# Patient Record
Sex: FEMALE | Race: WHITE | NOT HISPANIC OR LATINO | Employment: UNEMPLOYED | URBAN - METROPOLITAN AREA
[De-identification: names, ages, dates, MRNs, and addresses within clinical notes are randomized per-mention and may not be internally consistent; named-entity substitution may affect disease eponyms.]

---

## 2019-05-15 LAB
ABO + RH BLD: NORMAL
C TRACH RRNA SPEC QL PROBE: NEGATIVE
EXT MATERNIT21: NORMAL
HBV SURFACE AG SERPL QL IA: NEGATIVE
HCT VFR BLD AUTO: 41 % (ref 36–46)
HGB BLD-MCNC: 13.8 G/DL (ref 12–16)
HIV 1+2 AB+HIV1 P24 AG SERPL QL IA: NEGATIVE
INDIRECT COOMBS: NEGATIVE
MCV RBC AUTO: 91.6 FL (ref 82–108)
N GONORRHOEAE, AMPLIFIED DNA: NEGATIVE
PLATELET # BLD AUTO: 247 K/?L (ref 150–399)
RPR: NEGATIVE
RUBELLA IMMUNE STATUS: NORMAL
TSH SERPL DL<=0.005 MIU/L-ACNC: 1.49 UIU/ML (ref 0.41–5.9)
URINE CULTURE, ROUTINE: NORMAL

## 2019-06-14 ENCOUNTER — OFFICE VISIT (OUTPATIENT)
Dept: OBSTETRICS AND GYNECOLOGY | Facility: CLINIC | Age: 39
End: 2019-06-14
Payer: MEDICAID

## 2019-06-14 VITALS — RESPIRATION RATE: 18 BRPM | WEIGHT: 148.13 LBS | SYSTOLIC BLOOD PRESSURE: 110 MMHG | DIASTOLIC BLOOD PRESSURE: 72 MMHG

## 2019-06-14 DIAGNOSIS — Z98.891 H/O: C-SECTION: ICD-10-CM

## 2019-06-14 DIAGNOSIS — Z3A.18 18 WEEKS GESTATION OF PREGNANCY: Primary | ICD-10-CM

## 2019-06-14 DIAGNOSIS — Z32.00 POSSIBLE PREGNANCY, NOT YET CONFIRMED: ICD-10-CM

## 2019-06-14 DIAGNOSIS — O09.522 ELDERLY MULTIGRAVIDA IN SECOND TRIMESTER: ICD-10-CM

## 2019-06-14 PROCEDURE — 76815 OB US LIMITED FETUS(S): CPT | Mod: 26,S$PBB,, | Performed by: SPECIALIST

## 2019-06-14 PROCEDURE — 99999 PR PBB SHADOW E&M-NEW PATIENT-LVL III: CPT | Mod: PBBFAC,,, | Performed by: SPECIALIST

## 2019-06-14 PROCEDURE — 76815 OB US LIMITED FETUS(S): CPT | Mod: PBBFAC,PN | Performed by: SPECIALIST

## 2019-06-14 PROCEDURE — 99205 PR OFFICE/OUTPT VISIT, NEW, LEVL V, 60-74 MIN: ICD-10-PCS | Mod: TH,25,S$PBB, | Performed by: SPECIALIST

## 2019-06-14 PROCEDURE — 99205 OFFICE O/P NEW HI 60 MIN: CPT | Mod: TH,25,S$PBB, | Performed by: SPECIALIST

## 2019-06-14 PROCEDURE — 99203 OFFICE O/P NEW LOW 30 MIN: CPT | Mod: PBBFAC,TH,PN | Performed by: SPECIALIST

## 2019-06-14 PROCEDURE — 76815 PR  US,PREGNANT UTERUS,LIMITED, 1/> FETUSES: ICD-10-PCS | Mod: 26,S$PBB,, | Performed by: SPECIALIST

## 2019-06-14 PROCEDURE — 99999 PR PBB SHADOW E&M-NEW PATIENT-LVL III: ICD-10-PCS | Mod: PBBFAC,,, | Performed by: SPECIALIST

## 2019-06-14 NOTE — PROGRESS NOTES
39 yo WF , previous  and positive successful  presents for transfer of PNC from [place]. Pt states has had PNP and anatomy u/s as well as QUAD screen which pt states was WNL, No records. Positive quickening. EGA 18w2d per pt.  Pt desires .  I had a 20 min discussion regardingthe risks and benefits of . I specifically explained that  requires in house management and the fact that on call MD may not agree. Pt voices understanding of the above.  No past medical history on file.    No past surgical history on file.    No family history on file.    Social History     Socioeconomic History    Marital status:      Spouse name: Not on file    Number of children: Not on file    Years of education: Not on file    Highest education level: Not on file   Occupational History    Not on file   Social Needs    Financial resource strain: Not on file    Food insecurity:     Worry: Not on file     Inability: Not on file    Transportation needs:     Medical: Not on file     Non-medical: Not on file   Tobacco Use    Smoking status: Never Smoker    Smokeless tobacco: Never Used   Substance and Sexual Activity    Alcohol use: Not Currently    Drug use: Never    Sexual activity: Yes     Partners: Male   Lifestyle    Physical activity:     Days per week: Not on file     Minutes per session: Not on file    Stress: Not on file   Relationships    Social connections:     Talks on phone: Not on file     Gets together: Not on file     Attends [employer] service: Not on file     Active member of club or organization: Not on file     Attends meetings of clubs or organizations: Not on file     Relationship status: Not on file   Other Topics Concern    Not on file   Social History Narrative    Not on file       Current Outpatient Medications   Medication Sig Dispense Refill    PNV,calcium 72-iron-folic acid (PRENATAL VITAMIN PLUS LOW IRON) 27 mg iron- 1 mg Tab        No current facility-administered  medications for this visit.        Review of patient's allergies indicates:   Allergen Reactions    Allergenic extracts        Review of System:   General: no chills, fever, night sweats, weight gain or weight loss  Psychological: no depression or suicidal ideation  Breasts: no new or changing breast lumps, nipple discharge or masses.  Respiratory: no cough, shortness of breath, or wheezing  Cardiovascular: no chest pain or dyspnea on exertion  Gastrointestinal: no abdominal pain, change in bowel habits, or black or bloody stools  Genito-Urinary: no incontinence, urinary frequency/urgency or vulvar/vaginal symptoms, pelvic pain   Musculoskeletal: no gait disturbance or muscular weakness                                              General Appearance    A and O x 4, Cooperative, no distress   Breasts    Abdomen   Symmetrical, no masses, no discharge, skin changes , erythema or retraction. No adenopathy  Soft, gravid, FH 18cm     Genitourinary:   External rectal exam shows no thrombosed external hemorrhoids.   Pelvic exam was performed with patient supine.  No labial fusion.  There is no rash, lesion or injury on the right labia.  There is no rash, lesion or injury on the left labia.  No bleeding and no signs of injury around the vaginal introitus, urethra is without lesions and well supported. The cervix is visualized with no discharge, lesions or friability.Cervix closed and thick.  No vaginal discharge found.  No significant Cystocele, Enterocele or rectocele, and uterus well supported.     Extremities: Extremities normal, atraumatic, no cyanosis or edema                     Abdominal RTS  Viable male vertex EGA 18w3d  ABI 15.6 Anterior placenta Grade 1    Discussed AMA status and will await prenatal records   Will have pt RTO 4 weeks  At the end of patient visit, nurse and MD both asked pt if any improvements needed in visit experience and asked whether any further pt questions or concerns.

## 2019-07-05 ENCOUNTER — TELEPHONE (OUTPATIENT)
Dept: OBSTETRICS AND GYNECOLOGY | Facility: CLINIC | Age: 39
End: 2019-07-05

## 2019-07-05 NOTE — TELEPHONE ENCOUNTER
----- Message from Mary Santos sent at 7/5/2019  1:26 PM CDT -----  Contact: Pt  Pt is calling in regards to scheduling appt. She is currently 6 months pregnant.      Pls call pt back at .[phone]

## 2019-07-08 ENCOUNTER — TELEPHONE (OUTPATIENT)
Dept: OBSTETRICS AND GYNECOLOGY | Facility: CLINIC | Age: 39
End: 2019-07-08

## 2019-07-08 NOTE — TELEPHONE ENCOUNTER
----- Message from Maria C Tapia sent at 7/8/2019 12:07 PM CDT -----  Contact: pt   Stated she has question about the birth center her at [facility], she can be reached at [phone] Thanks

## 2019-07-11 ENCOUNTER — ROUTINE PRENATAL (OUTPATIENT)
Dept: OBSTETRICS AND GYNECOLOGY | Facility: CLINIC | Age: 39
End: 2019-07-11
Payer: MEDICAID

## 2019-07-11 VITALS — DIASTOLIC BLOOD PRESSURE: 62 MMHG | WEIGHT: 152.31 LBS | SYSTOLIC BLOOD PRESSURE: 110 MMHG

## 2019-07-11 DIAGNOSIS — O09.522 ELDERLY MULTIGRAVIDA IN SECOND TRIMESTER: ICD-10-CM

## 2019-07-11 DIAGNOSIS — Z3A.22 22 WEEKS GESTATION OF PREGNANCY: Primary | ICD-10-CM

## 2019-07-11 DIAGNOSIS — Z98.891 H/O: C-SECTION: ICD-10-CM

## 2019-07-11 PROCEDURE — 99999 PR PBB SHADOW E&M-EST. PATIENT-LVL II: ICD-10-PCS | Mod: PBBFAC,,, | Performed by: SPECIALIST

## 2019-07-11 PROCEDURE — 99214 PR OFFICE/OUTPT VISIT, EST, LEVL IV, 30-39 MIN: ICD-10-PCS | Mod: TH,S$PBB,, | Performed by: SPECIALIST

## 2019-07-11 PROCEDURE — 99214 OFFICE O/P EST MOD 30 MIN: CPT | Mod: TH,S$PBB,, | Performed by: SPECIALIST

## 2019-07-11 PROCEDURE — 99212 OFFICE O/P EST SF 10 MIN: CPT | Mod: PBBFAC,TH,PN | Performed by: SPECIALIST

## 2019-07-11 PROCEDURE — 99999 PR PBB SHADOW E&M-EST. PATIENT-LVL II: CPT | Mod: PBBFAC,,, | Performed by: SPECIALIST

## 2019-07-11 NOTE — PROGRESS NOTES
Pt returns for PNC  I had a 20 min discussion regarding  and importance of compliance.   Have not received records from Dr Brar office and I explained importance of this with pt and she states she will go directly to Office  In addition discussed and rec GD screen and Ab screen. Pt state she will probably not have done  I discussed AMA status and thus increased risks of HTN IUGR, PIH, PTL Aneuploidy etc and encouraged compliance.  I urged pt to complete anatomy u/s as discussed and ordered  Answered all questions  I reviewed pt's past medical history, past and current meds, family history, allergies and reviewed problem list  RTO 4 weeks

## 2019-07-22 ENCOUNTER — PATIENT MESSAGE (OUTPATIENT)
Dept: OBSTETRICS AND GYNECOLOGY | Facility: CLINIC | Age: 39
End: 2019-07-22

## 2019-07-22 ENCOUNTER — HOSPITAL ENCOUNTER (OUTPATIENT)
Dept: RADIOLOGY | Facility: HOSPITAL | Age: 39
Discharge: HOME | End: 2019-07-22
Attending: SPECIALIST
Payer: MEDICAID

## 2019-07-22 DIAGNOSIS — Z3A.18 18 WEEKS GESTATION OF PREGNANCY: ICD-10-CM

## 2019-07-22 PROCEDURE — 76805 US OB GREATER THAN 14 WEEKS FIRST GESTATION: ICD-10-PCS | Mod: 26,,, | Performed by: RADIOLOGY

## 2019-07-22 PROCEDURE — 76805 OB US >/= 14 WKS SNGL FETUS: CPT | Mod: TC,PN

## 2019-07-22 PROCEDURE — 76805 OB US >/= 14 WKS SNGL FETUS: CPT | Mod: 26,,, | Performed by: RADIOLOGY

## 2019-08-02 ENCOUNTER — PATIENT MESSAGE (OUTPATIENT)
Dept: OBSTETRICS AND GYNECOLOGY | Facility: CLINIC | Age: 39
End: 2019-08-02

## 2019-08-02 ENCOUNTER — TELEPHONE (OUTPATIENT)
Dept: OBSTETRICS AND GYNECOLOGY | Facility: CLINIC | Age: 39
End: 2019-08-02

## 2019-08-02 NOTE — TELEPHONE ENCOUNTER
Tried to contact pt regarding her appt today. Pt is scheduled in correctly and dr. muna bobby does not see ob pt. No answer. Lvm.

## 2019-08-05 ENCOUNTER — ROUTINE PRENATAL (OUTPATIENT)
Dept: OBSTETRICS AND GYNECOLOGY | Facility: CLINIC | Age: 39
End: 2019-08-05
Payer: MEDICAID

## 2019-08-05 VITALS — SYSTOLIC BLOOD PRESSURE: 110 MMHG | WEIGHT: 158.94 LBS | DIASTOLIC BLOOD PRESSURE: 72 MMHG

## 2019-08-05 DIAGNOSIS — Z34.90 NORMAL INTRAUTERINE PREGNANCY, ANTEPARTUM: ICD-10-CM

## 2019-08-05 DIAGNOSIS — Z98.891 H/O: C-SECTION: ICD-10-CM

## 2019-08-05 DIAGNOSIS — O26.899 RH NEGATIVE STATE IN ANTEPARTUM PERIOD: Primary | ICD-10-CM

## 2019-08-05 DIAGNOSIS — Z67.91 RH NEGATIVE STATE IN ANTEPARTUM PERIOD: Primary | ICD-10-CM

## 2019-08-05 DIAGNOSIS — O09.522 ELDERLY MULTIGRAVIDA IN SECOND TRIMESTER: ICD-10-CM

## 2019-08-05 PROCEDURE — 99213 PR OFFICE/OUTPT VISIT, EST, LEVL III, 20-29 MIN: ICD-10-PCS | Mod: TH,S$PBB,, | Performed by: ADVANCED PRACTICE MIDWIFE

## 2019-08-05 PROCEDURE — 99213 OFFICE O/P EST LOW 20 MIN: CPT | Mod: TH,S$PBB,, | Performed by: ADVANCED PRACTICE MIDWIFE

## 2019-08-05 PROCEDURE — 99999 PR PBB SHADOW E&M-EST. PATIENT-LVL II: ICD-10-PCS | Mod: PBBFAC,,, | Performed by: ADVANCED PRACTICE MIDWIFE

## 2019-08-05 PROCEDURE — 99999 PR PBB SHADOW E&M-EST. PATIENT-LVL II: CPT | Mod: PBBFAC,,, | Performed by: ADVANCED PRACTICE MIDWIFE

## 2019-08-05 PROCEDURE — 99212 OFFICE O/P EST SF 10 MIN: CPT | Mod: PBBFAC,TH | Performed by: ADVANCED PRACTICE MIDWIFE

## 2019-08-06 PROBLEM — Z34.90 NORMAL INTRAUTERINE PREGNANCY, ANTEPARTUM: Status: ACTIVE | Noted: 2019-08-06

## 2019-08-06 PROBLEM — Z67.91 RH NEGATIVE STATE IN ANTEPARTUM PERIOD: Status: ACTIVE | Noted: 2019-08-06

## 2019-08-06 PROBLEM — O26.899 RH NEGATIVE STATE IN ANTEPARTUM PERIOD: Status: ACTIVE | Noted: 2019-08-06

## 2019-08-06 NOTE — PROGRESS NOTES
Prenatal record reviewed.    Desirous of  and exploring venue options.  Receiving prenatal care in [place].  Will discuss with my attending, Dr. Mata and appointment made with her Encourage continuity of care with the same providers is optimum  Rhesus negative -RhoGAM workup  with 28 week labs per protocol-orders are placed.    Weekly ultrasound at 32 weeks secondary to AMA

## 2019-08-16 ENCOUNTER — ROUTINE PRENATAL (OUTPATIENT)
Dept: OBSTETRICS AND GYNECOLOGY | Facility: CLINIC | Age: 39
End: 2019-08-16
Payer: MEDICAID

## 2019-08-16 VITALS — WEIGHT: 159.19 LBS | DIASTOLIC BLOOD PRESSURE: 62 MMHG | SYSTOLIC BLOOD PRESSURE: 118 MMHG

## 2019-08-16 DIAGNOSIS — Z98.891 H/O: C-SECTION: ICD-10-CM

## 2019-08-16 DIAGNOSIS — Z67.91 RH NEGATIVE STATE IN ANTEPARTUM PERIOD: ICD-10-CM

## 2019-08-16 DIAGNOSIS — O09.522 ELDERLY MULTIGRAVIDA IN SECOND TRIMESTER: ICD-10-CM

## 2019-08-16 DIAGNOSIS — O26.899 RH NEGATIVE STATE IN ANTEPARTUM PERIOD: ICD-10-CM

## 2019-08-16 DIAGNOSIS — Z3A.27 27 WEEKS GESTATION OF PREGNANCY: Primary | ICD-10-CM

## 2019-08-16 LAB
BILIRUB SERPL-MCNC: NORMAL MG/DL
BLOOD URINE, POC: NORMAL
COLOR, POC UA: YELLOW
GLUCOSE UR QL STRIP: NORMAL
KETONES UR QL STRIP: NORMAL
LEUKOCYTE ESTERASE URINE, POC: NORMAL
NITRITE, POC UA: NORMAL
PH, POC UA: 7
PROTEIN, POC: NORMAL
SPECIFIC GRAVITY, POC UA: NORMAL
UROBILINOGEN, POC UA: NORMAL

## 2019-08-16 PROCEDURE — 99214 OFFICE O/P EST MOD 30 MIN: CPT | Mod: TH,S$PBB,, | Performed by: SPECIALIST

## 2019-08-16 PROCEDURE — 99214 PR OFFICE/OUTPT VISIT, EST, LEVL IV, 30-39 MIN: ICD-10-PCS | Mod: TH,S$PBB,, | Performed by: SPECIALIST

## 2019-08-16 PROCEDURE — 99999 PR PBB SHADOW E&M-EST. PATIENT-LVL II: ICD-10-PCS | Mod: PBBFAC,,, | Performed by: SPECIALIST

## 2019-08-16 PROCEDURE — 81002 URINALYSIS NONAUTO W/O SCOPE: CPT | Mod: PBBFAC,PN | Performed by: SPECIALIST

## 2019-08-16 PROCEDURE — 99999 PR PBB SHADOW E&M-EST. PATIENT-LVL II: CPT | Mod: PBBFAC,,, | Performed by: SPECIALIST

## 2019-08-16 PROCEDURE — 99212 OFFICE O/P EST SF 10 MIN: CPT | Mod: PBBFAC,TH,PN | Performed by: SPECIALIST

## 2019-08-16 NOTE — PROGRESS NOTES
Pt returns for care   Execllent fetal movement. Seeing multiple providers  Discussed importance of Ab Screen as well as ordered GD screen  Discussed AMA status as well as need for increase in surveillence  Recommend weekly testing at 32 weeks  Discussed daily Kick counts  I will have pt see Dr Mccormick as well for continuity  Rhogam on return if pt will get ab screen completed

## 2019-08-28 ENCOUNTER — LAB VISIT (OUTPATIENT)
Dept: LAB | Facility: HOSPITAL | Age: 39
End: 2019-08-28
Attending: SPECIALIST
Payer: MEDICAID

## 2019-08-28 ENCOUNTER — APPOINTMENT (OUTPATIENT)
Dept: LAB | Facility: HOSPITAL | Age: 39
End: 2019-08-28
Attending: SPECIALIST
Payer: MEDICAID

## 2019-08-28 DIAGNOSIS — Z3A.22 22 WEEKS GESTATION OF PREGNANCY: ICD-10-CM

## 2019-08-28 LAB
ABO + RH BLD: NORMAL
BASOPHILS # BLD AUTO: 0.02 K/UL (ref 0–0.2)
BASOPHILS NFR BLD: 0.2 % (ref 0–1.9)
BLD GP AB SCN CELLS X3 SERPL QL: NORMAL
DIFFERENTIAL METHOD: ABNORMAL
EOSINOPHIL # BLD AUTO: 0 K/UL (ref 0–0.5)
EOSINOPHIL NFR BLD: 0.4 % (ref 0–8)
ERYTHROCYTE [DISTWIDTH] IN BLOOD BY AUTOMATED COUNT: 13.1 % (ref 11.5–14.5)
HCT VFR BLD AUTO: 38.3 % (ref 37–48.5)
HGB BLD-MCNC: 12.3 G/DL (ref 12–16)
IMM GRANULOCYTES # BLD AUTO: 0.09 K/UL (ref 0–0.04)
IMM GRANULOCYTES NFR BLD AUTO: 1 % (ref 0–0.5)
LYMPHOCYTES # BLD AUTO: 1.7 K/UL (ref 1–4.8)
LYMPHOCYTES NFR BLD: 18.1 % (ref 18–48)
MCH RBC QN AUTO: 31.2 PG (ref 27–31)
MCHC RBC AUTO-ENTMCNC: 32.1 G/DL (ref 32–36)
MCV RBC AUTO: 97 FL (ref 82–98)
MONOCYTES # BLD AUTO: 0.3 K/UL (ref 0.3–1)
MONOCYTES NFR BLD: 3.4 % (ref 4–15)
NEUTROPHILS # BLD AUTO: 7.3 K/UL (ref 1.8–7.7)
NEUTROPHILS NFR BLD: 76.9 % (ref 38–73)
NRBC BLD-RTO: 0 /100 WBC
PLATELET # BLD AUTO: 248 K/UL (ref 150–350)
PMV BLD AUTO: 10.3 FL (ref 9.2–12.9)
RBC # BLD AUTO: 3.94 M/UL (ref 4–5.4)
RH BLD: NORMAL
TSH SERPL DL<=0.005 MIU/L-ACNC: 2.04 UIU/ML (ref 0.4–4)
WBC # BLD AUTO: 9.45 K/UL (ref 3.9–12.7)

## 2019-08-28 PROCEDURE — 86592 SYPHILIS TEST NON-TREP QUAL: CPT

## 2019-08-28 PROCEDURE — 86901 BLOOD TYPING SEROLOGIC RH(D): CPT

## 2019-08-28 PROCEDURE — 81220 CFTR GENE COM VARIANTS: CPT

## 2019-08-28 PROCEDURE — 86850 RBC ANTIBODY SCREEN: CPT

## 2019-08-28 PROCEDURE — 84443 ASSAY THYROID STIM HORMONE: CPT

## 2019-08-28 PROCEDURE — 85025 COMPLETE CBC W/AUTO DIFF WBC: CPT

## 2019-08-28 PROCEDURE — 86762 RUBELLA ANTIBODY: CPT

## 2019-08-28 PROCEDURE — 87340 HEPATITIS B SURFACE AG IA: CPT

## 2019-08-28 PROCEDURE — 86703 HIV-1/HIV-2 1 RESULT ANTBDY: CPT

## 2019-08-29 ENCOUNTER — ROUTINE PRENATAL (OUTPATIENT)
Dept: OBSTETRICS AND GYNECOLOGY | Facility: CLINIC | Age: 39
End: 2019-08-29
Payer: MEDICAID

## 2019-08-29 VITALS — WEIGHT: 162.06 LBS | SYSTOLIC BLOOD PRESSURE: 116 MMHG | DIASTOLIC BLOOD PRESSURE: 58 MMHG

## 2019-08-29 DIAGNOSIS — Z98.891 H/O: C-SECTION: Primary | ICD-10-CM

## 2019-08-29 DIAGNOSIS — Z3A.29 PREGNANCY WITH 29 COMPLETED WEEKS GESTATION: ICD-10-CM

## 2019-08-29 LAB
HBV SURFACE AG SERPL QL IA: NEGATIVE
HIV 1+2 AB+HIV1 P24 AG SERPL QL IA: NEGATIVE
RPR SER QL: NORMAL
RUBV IGG SER-ACNC: 22.4 IU/ML
RUBV IGG SER-IMP: REACTIVE

## 2019-08-29 PROCEDURE — 99212 OFFICE O/P EST SF 10 MIN: CPT | Mod: PBBFAC | Performed by: OBSTETRICS & GYNECOLOGY

## 2019-08-29 PROCEDURE — 99212 PR OFFICE/OUTPT VISIT, EST, LEVL II, 10-19 MIN: ICD-10-PCS | Mod: TH,S$PBB,, | Performed by: OBSTETRICS & GYNECOLOGY

## 2019-08-29 PROCEDURE — 99212 OFFICE O/P EST SF 10 MIN: CPT | Mod: TH,S$PBB,, | Performed by: OBSTETRICS & GYNECOLOGY

## 2019-08-29 PROCEDURE — 99999 PR PBB SHADOW E&M-EST. PATIENT-LVL II: CPT | Mod: PBBFAC,,, | Performed by: OBSTETRICS & GYNECOLOGY

## 2019-08-29 PROCEDURE — 99999 PR PBB SHADOW E&M-EST. PATIENT-LVL II: ICD-10-PCS | Mod: PBBFAC,,, | Performed by: OBSTETRICS & GYNECOLOGY

## 2019-08-29 NOTE — PROGRESS NOTES
Pt here from [place]; has had successful  in [place] but concerned that she would not have  option with all providers there. Discussed our practice model, MD on site for vbacs, option for tub birth if no risk factors develop. Declines tdap/flu. Rhogam today

## 2019-08-29 NOTE — NURSING
Verified pt with two identifiers name and . Allergies and medications reviewed. Rhogam administered IM to right ventrogluteal while using aseptic technique. No discomfort noted. Pt tolerated well. Advised pt to wait 15 minutes in the lobby to monitor for side effects. Pt verbalized understanding.

## 2019-08-30 LAB — CFTR MUT ANL BLD/T: NORMAL

## 2019-09-05 ENCOUNTER — PATIENT MESSAGE (OUTPATIENT)
Dept: OBSTETRICS AND GYNECOLOGY | Facility: CLINIC | Age: 39
End: 2019-09-05

## 2019-09-07 ENCOUNTER — LAB VISIT (OUTPATIENT)
Dept: LAB | Facility: HOSPITAL | Age: 39
End: 2019-09-07
Attending: ADVANCED PRACTICE MIDWIFE
Payer: MEDICAID

## 2019-09-07 DIAGNOSIS — O26.899 RH NEGATIVE STATE IN ANTEPARTUM PERIOD: ICD-10-CM

## 2019-09-07 DIAGNOSIS — Z67.91 RH NEGATIVE STATE IN ANTEPARTUM PERIOD: ICD-10-CM

## 2019-09-07 LAB — GLUCOSE SERPL-MCNC: 154 MG/DL (ref 70–140)

## 2019-09-07 PROCEDURE — 82950 GLUCOSE TEST: CPT

## 2019-09-07 PROCEDURE — 36415 COLL VENOUS BLD VENIPUNCTURE: CPT | Mod: PO

## 2019-09-10 ENCOUNTER — PATIENT MESSAGE (OUTPATIENT)
Dept: OBSTETRICS AND GYNECOLOGY | Facility: CLINIC | Age: 39
End: 2019-09-10

## 2019-09-10 DIAGNOSIS — O26.899 RH NEGATIVE STATE IN ANTEPARTUM PERIOD: ICD-10-CM

## 2019-09-10 DIAGNOSIS — O99.810 GLUCOSE INTOLERANCE OF PREGNANCY: Primary | ICD-10-CM

## 2019-09-10 DIAGNOSIS — Z67.91 RH NEGATIVE STATE IN ANTEPARTUM PERIOD: ICD-10-CM

## 2019-09-18 ENCOUNTER — ROUTINE PRENATAL (OUTPATIENT)
Dept: OBSTETRICS AND GYNECOLOGY | Facility: CLINIC | Age: 39
End: 2019-09-18
Payer: MEDICAID

## 2019-09-18 VITALS — WEIGHT: 163.56 LBS | DIASTOLIC BLOOD PRESSURE: 66 MMHG | SYSTOLIC BLOOD PRESSURE: 134 MMHG

## 2019-09-18 DIAGNOSIS — Z3A.32 32 WEEKS GESTATION OF PREGNANCY: ICD-10-CM

## 2019-09-18 DIAGNOSIS — O09.523 ELDERLY MULTIGRAVIDA IN THIRD TRIMESTER: Primary | ICD-10-CM

## 2019-09-18 DIAGNOSIS — Z98.891 H/O: C-SECTION: ICD-10-CM

## 2019-09-18 LAB
BILIRUB SERPL-MCNC: NORMAL MG/DL
BLOOD URINE, POC: NORMAL
COLOR, POC UA: YELLOW
GLUCOSE UR QL STRIP: NORMAL
KETONES UR QL STRIP: NORMAL
LEUKOCYTE ESTERASE URINE, POC: NORMAL
NITRITE, POC UA: NORMAL
PH, POC UA: 5
PROTEIN, POC: NORMAL
SPECIFIC GRAVITY, POC UA: NORMAL
UROBILINOGEN, POC UA: NORMAL

## 2019-09-18 PROCEDURE — 99214 OFFICE O/P EST MOD 30 MIN: CPT | Mod: TH,25,S$PBB, | Performed by: SPECIALIST

## 2019-09-18 PROCEDURE — 59025 FETAL NON-STRESS TEST: CPT | Mod: 26,S$PBB,, | Performed by: SPECIALIST

## 2019-09-18 PROCEDURE — 99214 PR OFFICE/OUTPT VISIT, EST, LEVL IV, 30-39 MIN: ICD-10-PCS | Mod: TH,25,S$PBB, | Performed by: SPECIALIST

## 2019-09-18 PROCEDURE — 59025 FETAL NON-STRESS TEST: CPT | Mod: PBBFAC,PN | Performed by: SPECIALIST

## 2019-09-18 PROCEDURE — 99213 OFFICE O/P EST LOW 20 MIN: CPT | Mod: PBBFAC,TH,PN | Performed by: SPECIALIST

## 2019-09-18 PROCEDURE — 99999 PR PBB SHADOW E&M-EST. PATIENT-LVL III: CPT | Mod: PBBFAC,,, | Performed by: SPECIALIST

## 2019-09-18 PROCEDURE — 59025 PR FETAL 2N-STRESS TEST: ICD-10-PCS | Mod: 26,S$PBB,, | Performed by: SPECIALIST

## 2019-09-18 PROCEDURE — 99999 PR PBB SHADOW E&M-EST. PATIENT-LVL III: ICD-10-PCS | Mod: PBBFAC,,, | Performed by: SPECIALIST

## 2019-09-18 PROCEDURE — 81002 URINALYSIS NONAUTO W/O SCOPE: CPT | Mod: PBBFAC,PN | Performed by: SPECIALIST

## 2019-09-18 NOTE — PROGRESS NOTES
FETAL SURVEILLANCE TESTING SUMMARY  INDICATIONS:AMA     Fetal doppler heart rate tracing obtained in the usual fashion with the patient in the left supine position.     OBJECTIVE RESULTS:  Baseline fetal heart rate: 150     Fetal heart variability: present  Fetal Heart Rate decelerations:no  Fetal acoustic stimulator: no  Fetal Heart Rate accelerations: present  Fetal Non-stress Test: Reactive  Uterine irritability:no     Fetal surveillance: Reasurring    I discussed daily kick counts and rec weekly testing   In addition, discussed and rec repeat u/s for fetal growth and ABI   Answered all questions   RTO 1 week, weekly NST

## 2019-09-30 ENCOUNTER — HOSPITAL ENCOUNTER (OUTPATIENT)
Dept: RADIOLOGY | Facility: HOSPITAL | Age: 39
Discharge: HOME | End: 2019-09-30
Attending: SPECIALIST
Payer: MEDICAID

## 2019-09-30 DIAGNOSIS — O09.523 ELDERLY MULTIGRAVIDA IN THIRD TRIMESTER: ICD-10-CM

## 2019-09-30 PROCEDURE — 76816 US OB FOLLOW UP EA GESTATION TRANSABDOMINAL: ICD-10-PCS | Mod: 26,,, | Performed by: RADIOLOGY

## 2019-09-30 PROCEDURE — 76816 OB US FOLLOW-UP PER FETUS: CPT | Mod: 26,,, | Performed by: RADIOLOGY

## 2019-09-30 PROCEDURE — 76816 OB US FOLLOW-UP PER FETUS: CPT | Mod: TC,PN

## 2019-10-07 ENCOUNTER — ROUTINE PRENATAL (OUTPATIENT)
Dept: OBSTETRICS AND GYNECOLOGY | Facility: CLINIC | Age: 39
End: 2019-10-07
Payer: MEDICAID

## 2019-10-07 VITALS — DIASTOLIC BLOOD PRESSURE: 66 MMHG | WEIGHT: 170.88 LBS | SYSTOLIC BLOOD PRESSURE: 104 MMHG

## 2019-10-07 DIAGNOSIS — Z98.891 H/O: C-SECTION: Primary | ICD-10-CM

## 2019-10-07 DIAGNOSIS — O09.523 ELDERLY MULTIGRAVIDA IN THIRD TRIMESTER: ICD-10-CM

## 2019-10-07 PROCEDURE — 99212 OFFICE O/P EST SF 10 MIN: CPT | Mod: TH,S$PBB,, | Performed by: OBSTETRICS & GYNECOLOGY

## 2019-10-07 PROCEDURE — 99212 PR OFFICE/OUTPT VISIT, EST, LEVL II, 10-19 MIN: ICD-10-PCS | Mod: TH,S$PBB,, | Performed by: OBSTETRICS & GYNECOLOGY

## 2019-10-07 PROCEDURE — 99999 PR PBB SHADOW E&M-EST. PATIENT-LVL II: CPT | Mod: PBBFAC,,, | Performed by: OBSTETRICS & GYNECOLOGY

## 2019-10-07 PROCEDURE — 99212 OFFICE O/P EST SF 10 MIN: CPT | Mod: PBBFAC,TH | Performed by: OBSTETRICS & GYNECOLOGY

## 2019-10-07 PROCEDURE — 99999 PR PBB SHADOW E&M-EST. PATIENT-LVL II: ICD-10-PCS | Mod: PBBFAC,,, | Performed by: OBSTETRICS & GYNECOLOGY

## 2019-10-07 NOTE — PROGRESS NOTES
Pt without complaints. Mild tightening. Fetal surveillance appears to be reassuring. GBS next visit then weekly visits-pt will schedule

## 2019-10-16 ENCOUNTER — ROUTINE PRENATAL (OUTPATIENT)
Dept: OBSTETRICS AND GYNECOLOGY | Facility: CLINIC | Age: 39
End: 2019-10-16
Payer: MEDICAID

## 2019-10-16 VITALS — WEIGHT: 167.56 LBS | DIASTOLIC BLOOD PRESSURE: 68 MMHG | SYSTOLIC BLOOD PRESSURE: 102 MMHG

## 2019-10-16 DIAGNOSIS — Z3A.36 36 WEEKS GESTATION OF PREGNANCY: Primary | ICD-10-CM

## 2019-10-16 DIAGNOSIS — Z98.891 H/O: C-SECTION: ICD-10-CM

## 2019-10-16 DIAGNOSIS — O09.523 ELDERLY MULTIGRAVIDA IN THIRD TRIMESTER: ICD-10-CM

## 2019-10-16 LAB
BILIRUB SERPL-MCNC: NORMAL MG/DL
BLOOD URINE, POC: NORMAL
COLOR, POC UA: YELLOW
GLUCOSE UR QL STRIP: NORMAL
KETONES UR QL STRIP: NORMAL
LEUKOCYTE ESTERASE URINE, POC: NORMAL
NITRITE, POC UA: NORMAL
PH, POC UA: 6
PROTEIN, POC: NORMAL
SPECIFIC GRAVITY, POC UA: NORMAL
UROBILINOGEN, POC UA: NORMAL

## 2019-10-16 PROCEDURE — 99999 PR PBB SHADOW E&M-EST. PATIENT-LVL II: ICD-10-PCS | Mod: PBBFAC,,, | Performed by: SPECIALIST

## 2019-10-16 PROCEDURE — 99999 PR PBB SHADOW E&M-EST. PATIENT-LVL II: CPT | Mod: PBBFAC,,, | Performed by: SPECIALIST

## 2019-10-16 PROCEDURE — 59025 FETAL NON-STRESS TEST: CPT | Mod: PBBFAC,PN | Performed by: SPECIALIST

## 2019-10-16 PROCEDURE — 99214 PR OFFICE/OUTPT VISIT, EST, LEVL IV, 30-39 MIN: ICD-10-PCS | Mod: TH,25,S$PBB, | Performed by: SPECIALIST

## 2019-10-16 PROCEDURE — 81002 URINALYSIS NONAUTO W/O SCOPE: CPT | Mod: PBBFAC,PN | Performed by: SPECIALIST

## 2019-10-16 PROCEDURE — 59025 FETAL NON-STRESS TEST: CPT | Mod: 26,S$PBB,, | Performed by: SPECIALIST

## 2019-10-16 PROCEDURE — 87081 CULTURE SCREEN ONLY: CPT

## 2019-10-16 PROCEDURE — 99214 OFFICE O/P EST MOD 30 MIN: CPT | Mod: TH,25,S$PBB, | Performed by: SPECIALIST

## 2019-10-16 PROCEDURE — 59025 PR FETAL 2N-STRESS TEST: ICD-10-PCS | Mod: 26,S$PBB,, | Performed by: SPECIALIST

## 2019-10-16 PROCEDURE — 99212 OFFICE O/P EST SF 10 MIN: CPT | Mod: PBBFAC,TH,PN | Performed by: SPECIALIST

## 2019-10-19 LAB — BACTERIA SPEC AEROBE CULT: NORMAL

## 2019-10-22 ENCOUNTER — ROUTINE PRENATAL (OUTPATIENT)
Dept: OBSTETRICS AND GYNECOLOGY | Facility: CLINIC | Age: 39
End: 2019-10-22
Payer: MEDICAID

## 2019-10-22 VITALS — DIASTOLIC BLOOD PRESSURE: 68 MMHG | SYSTOLIC BLOOD PRESSURE: 106 MMHG | WEIGHT: 170 LBS

## 2019-10-22 DIAGNOSIS — O09.523 ELDERLY MULTIGRAVIDA IN THIRD TRIMESTER: ICD-10-CM

## 2019-10-22 DIAGNOSIS — Z98.891 H/O: C-SECTION: ICD-10-CM

## 2019-10-22 DIAGNOSIS — Z3A.36 36 WEEKS GESTATION OF PREGNANCY: Primary | ICD-10-CM

## 2019-10-22 LAB
BILIRUB SERPL-MCNC: NORMAL MG/DL
BLOOD URINE, POC: NORMAL
COLOR, POC UA: YELLOW
GLUCOSE UR QL STRIP: 100
KETONES UR QL STRIP: NORMAL
LEUKOCYTE ESTERASE URINE, POC: NORMAL
NITRITE, POC UA: NORMAL
PH, POC UA: 6
PROTEIN, POC: NORMAL
SPECIFIC GRAVITY, POC UA: NORMAL
UROBILINOGEN, POC UA: NORMAL

## 2019-10-22 PROCEDURE — 99214 OFFICE O/P EST MOD 30 MIN: CPT | Mod: TH,25,S$PBB, | Performed by: SPECIALIST

## 2019-10-22 PROCEDURE — 99214 PR OFFICE/OUTPT VISIT, EST, LEVL IV, 30-39 MIN: ICD-10-PCS | Mod: TH,25,S$PBB, | Performed by: SPECIALIST

## 2019-10-22 PROCEDURE — 59025 FETAL NON-STRESS TEST: CPT | Mod: 26,S$PBB,, | Performed by: SPECIALIST

## 2019-10-22 PROCEDURE — 59025 PR FETAL 2N-STRESS TEST: ICD-10-PCS | Mod: 26,S$PBB,, | Performed by: SPECIALIST

## 2019-10-22 PROCEDURE — 81002 URINALYSIS NONAUTO W/O SCOPE: CPT | Mod: PBBFAC,PN | Performed by: SPECIALIST

## 2019-10-22 PROCEDURE — 59025 FETAL NON-STRESS TEST: CPT | Mod: PBBFAC,PN | Performed by: SPECIALIST

## 2019-10-22 PROCEDURE — 99999 PR PBB SHADOW E&M-EST. PATIENT-LVL II: ICD-10-PCS | Mod: PBBFAC,,, | Performed by: SPECIALIST

## 2019-10-22 PROCEDURE — 99999 PR PBB SHADOW E&M-EST. PATIENT-LVL II: CPT | Mod: PBBFAC,,, | Performed by: SPECIALIST

## 2019-10-22 PROCEDURE — 99212 OFFICE O/P EST SF 10 MIN: CPT | Mod: PBBFAC,TH,PN,25 | Performed by: SPECIALIST

## 2019-10-22 NOTE — PROGRESS NOTES
FETAL SURVEILLANCE TESTING SUMMARY  INDICATIONS:AMA     Fetal doppler heart rate tracing obtained in the usual fashion with the patient in the left supine position.     OBJECTIVE RESULTS:  Baseline fetal heart rate: 150     Fetal heart variability: present  Fetal Heart Rate decelerations:no  Fetal acoustic stimulator: no  Fetal Heart Rate accelerations: present  Fetal Non-stress Test: Reactive  Uterine irritability:no     Fetal surveillance: Reasurring    Discussed Neg GBS  Daily kick counts   Rec continued weekly tetsing  RTO 1 week

## 2019-10-28 ENCOUNTER — ROUTINE PRENATAL (OUTPATIENT)
Dept: OBSTETRICS AND GYNECOLOGY | Facility: CLINIC | Age: 39
End: 2019-10-28
Payer: MEDICAID

## 2019-10-28 VITALS — DIASTOLIC BLOOD PRESSURE: 62 MMHG | WEIGHT: 171.31 LBS | SYSTOLIC BLOOD PRESSURE: 118 MMHG

## 2019-10-28 DIAGNOSIS — Z98.891 H/O: C-SECTION: Primary | ICD-10-CM

## 2019-10-28 DIAGNOSIS — O09.523 ELDERLY MULTIGRAVIDA IN THIRD TRIMESTER: ICD-10-CM

## 2019-10-28 DIAGNOSIS — Z3A.37 37 WEEKS GESTATION OF PREGNANCY: ICD-10-CM

## 2019-10-28 LAB
BILIRUB SERPL-MCNC: NORMAL MG/DL
BLOOD URINE, POC: NORMAL
COLOR, POC UA: NORMAL
GLUCOSE UR QL STRIP: NORMAL
KETONES UR QL STRIP: NORMAL
LEUKOCYTE ESTERASE URINE, POC: NORMAL
NITRITE, POC UA: NORMAL
PH, POC UA: 6
PROTEIN, POC: NORMAL
SPECIFIC GRAVITY, POC UA: NORMAL
UROBILINOGEN, POC UA: NORMAL

## 2019-10-28 PROCEDURE — 59025 FETAL NON-STRESS TEST: CPT | Mod: 26,S$PBB,, | Performed by: SPECIALIST

## 2019-10-28 PROCEDURE — 99999 PR PBB SHADOW E&M-EST. PATIENT-LVL II: ICD-10-PCS | Mod: PBBFAC,,, | Performed by: SPECIALIST

## 2019-10-28 PROCEDURE — 59025 FETAL NON-STRESS TEST: CPT | Mod: PBBFAC,PN | Performed by: SPECIALIST

## 2019-10-28 PROCEDURE — 59025 PR FETAL 2N-STRESS TEST: ICD-10-PCS | Mod: 26,S$PBB,, | Performed by: SPECIALIST

## 2019-10-28 PROCEDURE — 99214 PR OFFICE/OUTPT VISIT, EST, LEVL IV, 30-39 MIN: ICD-10-PCS | Mod: TH,25,S$PBB, | Performed by: SPECIALIST

## 2019-10-28 PROCEDURE — 99999 PR PBB SHADOW E&M-EST. PATIENT-LVL II: CPT | Mod: PBBFAC,,, | Performed by: SPECIALIST

## 2019-10-28 PROCEDURE — 99212 OFFICE O/P EST SF 10 MIN: CPT | Mod: PBBFAC,TH,PN | Performed by: SPECIALIST

## 2019-10-28 PROCEDURE — 99214 OFFICE O/P EST MOD 30 MIN: CPT | Mod: TH,25,S$PBB, | Performed by: SPECIALIST

## 2019-10-28 PROCEDURE — 81002 URINALYSIS NONAUTO W/O SCOPE: CPT | Mod: PBBFAC,PN | Performed by: SPECIALIST

## 2019-10-28 NOTE — PROGRESS NOTES
FETAL SURVEILLANCE TESTING SUMMARY  INDICATIONS:AMA     Fetal doppler heart rate tracing obtained in the usual fashion with the patient in the left supine position.     OBJECTIVE RESULTS:  Baseline fetal heart rate: 150     Fetal heart variability: present  Fetal Heart Rate decelerations:no  Fetal acoustic stimulator: no  Fetal Heart Rate accelerations: present  Fetal Non-stress Test: Reactive  Uterine irritability:no     Fetal surveillance: Reasurring  Plan Daily kick counts and continue weekly testing  RTO 1 week

## 2019-11-05 ENCOUNTER — ROUTINE PRENATAL (OUTPATIENT)
Dept: OBSTETRICS AND GYNECOLOGY | Facility: CLINIC | Age: 39
End: 2019-11-05
Payer: MEDICAID

## 2019-11-05 VITALS — DIASTOLIC BLOOD PRESSURE: 74 MMHG | WEIGHT: 172.63 LBS | SYSTOLIC BLOOD PRESSURE: 124 MMHG

## 2019-11-05 DIAGNOSIS — O09.523 ELDERLY MULTIGRAVIDA IN THIRD TRIMESTER: ICD-10-CM

## 2019-11-05 DIAGNOSIS — Z3A.38 38 WEEKS GESTATION OF PREGNANCY: Primary | ICD-10-CM

## 2019-11-05 DIAGNOSIS — Z98.891 H/O: C-SECTION: ICD-10-CM

## 2019-11-05 PROCEDURE — 81002 URINALYSIS NONAUTO W/O SCOPE: CPT | Mod: PBBFAC,PN | Performed by: SPECIALIST

## 2019-11-05 PROCEDURE — 99212 OFFICE O/P EST SF 10 MIN: CPT | Mod: PBBFAC,TH,PN,25 | Performed by: SPECIALIST

## 2019-11-05 PROCEDURE — 99214 OFFICE O/P EST MOD 30 MIN: CPT | Mod: TH,25,S$PBB, | Performed by: SPECIALIST

## 2019-11-05 PROCEDURE — 99214 PR OFFICE/OUTPT VISIT, EST, LEVL IV, 30-39 MIN: ICD-10-PCS | Mod: TH,25,S$PBB, | Performed by: SPECIALIST

## 2019-11-05 PROCEDURE — 59025 PR FETAL 2N-STRESS TEST: ICD-10-PCS | Mod: 26,S$PBB,, | Performed by: SPECIALIST

## 2019-11-05 PROCEDURE — 99999 PR PBB SHADOW E&M-EST. PATIENT-LVL II: CPT | Mod: PBBFAC,,, | Performed by: SPECIALIST

## 2019-11-05 PROCEDURE — 99999 PR PBB SHADOW E&M-EST. PATIENT-LVL II: ICD-10-PCS | Mod: PBBFAC,,, | Performed by: SPECIALIST

## 2019-11-05 PROCEDURE — 59025 FETAL NON-STRESS TEST: CPT | Mod: PBBFAC,PN | Performed by: SPECIALIST

## 2019-11-05 PROCEDURE — 59025 FETAL NON-STRESS TEST: CPT | Mod: 26,S$PBB,, | Performed by: SPECIALIST

## 2019-11-05 NOTE — PROGRESS NOTES
FETAL SURVEILLANCE TESTING SUMMARY  INDICATIONS:AMA     Fetal doppler heart rate tracing obtained in the usual fashion with the patient in the left supine position.     OBJECTIVE RESULTS:  Baseline fetal heart rate: 150     Fetal heart variability: present  Fetal Heart Rate decelerations:no  Fetal acoustic stimulator: no  Fetal Heart Rate accelerations: present  Fetal Non-stress Test: Reactive  Uterine irritability:no     Fetal surveillance: Reasurring  Plan Daily kick counts and continue weekly testing          Labor precautions    RTO 1 week

## 2019-11-19 ENCOUNTER — NURSE TRIAGE (OUTPATIENT)
Dept: ADMINISTRATIVE | Facility: CLINIC | Age: 39
End: 2019-11-19

## 2019-11-19 ENCOUNTER — HOSPITAL ENCOUNTER (INPATIENT)
Facility: HOSPITAL | Age: 39
LOS: 2 days | Discharge: HOME | End: 2019-11-21
Attending: OBSTETRICS & GYNECOLOGY | Admitting: OBSTETRICS & GYNECOLOGY
Payer: MEDICAID

## 2019-11-19 DIAGNOSIS — O34.219 VBAC, DELIVERED, CURRENT HOSPITALIZATION: Primary | ICD-10-CM

## 2019-11-19 DIAGNOSIS — O34.219 PATIENT DESIRES VAGINAL BIRTH AFTER CESAREAN SECTION (VBAC): ICD-10-CM

## 2019-11-19 PROBLEM — O09.522 ELDERLY MULTIGRAVIDA IN SECOND TRIMESTER: Status: RESOLVED | Noted: 2019-06-14 | Resolved: 2019-11-19

## 2019-11-19 PROBLEM — Z34.90 NORMAL INTRAUTERINE PREGNANCY, ANTEPARTUM: Status: RESOLVED | Noted: 2019-08-06 | Resolved: 2019-11-19

## 2019-11-19 PROBLEM — O99.810 GLUCOSE INTOLERANCE OF PREGNANCY: Status: RESOLVED | Noted: 2019-09-10 | Resolved: 2019-11-19

## 2019-11-19 LAB
ABO + RH BLD: NORMAL
BASOPHILS # BLD AUTO: 0.02 K/UL (ref 0–0.2)
BASOPHILS NFR BLD: 0.1 % (ref 0–1.9)
BLD GP AB SCN CELLS X3 SERPL QL: NORMAL
BLOOD GROUP ANTIBODIES SERPL: NORMAL
DIFFERENTIAL METHOD: ABNORMAL
EOSINOPHIL # BLD AUTO: 0 K/UL (ref 0–0.5)
EOSINOPHIL NFR BLD: 0 % (ref 0–8)
ERYTHROCYTE [DISTWIDTH] IN BLOOD BY AUTOMATED COUNT: 13 % (ref 11.5–14.5)
HCT VFR BLD AUTO: 40 % (ref 37–48.5)
HGB BLD-MCNC: 12.9 G/DL (ref 12–16)
IMM GRANULOCYTES # BLD AUTO: 0.09 K/UL (ref 0–0.04)
IMM GRANULOCYTES NFR BLD AUTO: 0.6 % (ref 0–0.5)
LYMPHOCYTES # BLD AUTO: 0.8 K/UL (ref 1–4.8)
LYMPHOCYTES NFR BLD: 5.6 % (ref 18–48)
MCH RBC QN AUTO: 30 PG (ref 27–31)
MCHC RBC AUTO-ENTMCNC: 32.3 G/DL (ref 32–36)
MCV RBC AUTO: 93 FL (ref 82–98)
MONOCYTES # BLD AUTO: 0.2 K/UL (ref 0.3–1)
MONOCYTES NFR BLD: 1.6 % (ref 4–15)
NEUTROPHILS # BLD AUTO: 13.6 K/UL (ref 1.8–7.7)
NEUTROPHILS NFR BLD: 92.1 % (ref 38–73)
NRBC BLD-RTO: 0 /100 WBC
PATHOLOGIST INTERPRETATION AB/XM: NORMAL
PLATELET # BLD AUTO: 192 K/UL (ref 150–350)
PMV BLD AUTO: 9.9 FL (ref 9.2–12.9)
RBC # BLD AUTO: 4.3 M/UL (ref 4–5.4)
WBC # BLD AUTO: 14.78 K/UL (ref 3.9–12.7)

## 2019-11-19 PROCEDURE — 72100002 HC LABOR CARE, 1ST 8 HOURS

## 2019-11-19 PROCEDURE — 86077 PATHOLOGIST INTERPRETATION AB/XM: ICD-10-PCS | Mod: ,,, | Performed by: PATHOLOGY

## 2019-11-19 PROCEDURE — 85025 COMPLETE CBC W/AUTO DIFF WBC: CPT

## 2019-11-19 PROCEDURE — 72200004 HC VAGINAL DELIVERY LEVEL I

## 2019-11-19 PROCEDURE — 86870 RBC ANTIBODY IDENTIFICATION: CPT

## 2019-11-19 PROCEDURE — 59025 FETAL NON-STRESS TEST: CPT

## 2019-11-19 PROCEDURE — 59409 OBSTETRICAL CARE: CPT | Mod: GB,,, | Performed by: ADVANCED PRACTICE MIDWIFE

## 2019-11-19 PROCEDURE — 59409 PR OBSTETRICAL CARE,VAG DELIV ONLY: ICD-10-PCS | Mod: GB,,, | Performed by: ADVANCED PRACTICE MIDWIFE

## 2019-11-19 PROCEDURE — 86077 PHYS BLOOD BANK SERV XMATCH: CPT | Mod: ,,, | Performed by: PATHOLOGY

## 2019-11-19 PROCEDURE — 99211 OFF/OP EST MAY X REQ PHY/QHP: CPT | Mod: 25,TH

## 2019-11-19 PROCEDURE — 86850 RBC ANTIBODY SCREEN: CPT

## 2019-11-19 PROCEDURE — 72100003 HC LABOR CARE, EA. ADDL. 8 HRS

## 2019-11-19 PROCEDURE — 11000001 HC ACUTE MED/SURG PRIVATE ROOM

## 2019-11-19 RX ORDER — HYDROCODONE BITARTRATE AND ACETAMINOPHEN 5; 325 MG/1; MG/1
1 TABLET ORAL EVERY 4 HOURS PRN
Status: DISCONTINUED | OUTPATIENT
Start: 2019-11-19 | End: 2019-11-21 | Stop reason: HOSPADM

## 2019-11-19 RX ORDER — HYDROCORTISONE 25 MG/G
CREAM TOPICAL 3 TIMES DAILY PRN
Status: DISCONTINUED | OUTPATIENT
Start: 2019-11-19 | End: 2019-11-21 | Stop reason: HOSPADM

## 2019-11-19 RX ORDER — OXYTOCIN/RINGER'S LACTATE 30/500 ML
95 PLASTIC BAG, INJECTION (ML) INTRAVENOUS ONCE
Status: DISCONTINUED | OUTPATIENT
Start: 2019-11-19 | End: 2019-11-19

## 2019-11-19 RX ORDER — SODIUM CHLORIDE, SODIUM LACTATE, POTASSIUM CHLORIDE, CALCIUM CHLORIDE 600; 310; 30; 20 MG/100ML; MG/100ML; MG/100ML; MG/100ML
INJECTION, SOLUTION INTRAVENOUS CONTINUOUS
Status: DISCONTINUED | OUTPATIENT
Start: 2019-11-19 | End: 2019-11-19

## 2019-11-19 RX ORDER — OXYTOCIN/RINGER'S LACTATE 30/500 ML
334 PLASTIC BAG, INJECTION (ML) INTRAVENOUS ONCE
Status: DISCONTINUED | OUTPATIENT
Start: 2019-11-19 | End: 2019-11-19

## 2019-11-19 RX ORDER — SODIUM CHLORIDE 9 MG/ML
INJECTION, SOLUTION INTRAVENOUS
Status: DISCONTINUED | OUTPATIENT
Start: 2019-11-19 | End: 2019-11-19

## 2019-11-19 RX ORDER — ONDANSETRON 8 MG/1
8 TABLET, ORALLY DISINTEGRATING ORAL EVERY 8 HOURS PRN
Status: DISCONTINUED | OUTPATIENT
Start: 2019-11-19 | End: 2019-11-19

## 2019-11-19 RX ORDER — CALCIUM CARBONATE 200(500)MG
500 TABLET,CHEWABLE ORAL 3 TIMES DAILY PRN
Status: DISCONTINUED | OUTPATIENT
Start: 2019-11-19 | End: 2019-11-19

## 2019-11-19 RX ORDER — DIPHENHYDRAMINE HYDROCHLORIDE 50 MG/ML
25 INJECTION INTRAMUSCULAR; INTRAVENOUS EVERY 4 HOURS PRN
Status: DISCONTINUED | OUTPATIENT
Start: 2019-11-19 | End: 2019-11-21 | Stop reason: HOSPADM

## 2019-11-19 RX ORDER — ACETAMINOPHEN 325 MG/1
650 TABLET ORAL EVERY 6 HOURS PRN
Status: DISCONTINUED | OUTPATIENT
Start: 2019-11-19 | End: 2019-11-21 | Stop reason: HOSPADM

## 2019-11-19 RX ORDER — SIMETHICONE 80 MG
1 TABLET,CHEWABLE ORAL 4 TIMES DAILY PRN
Status: DISCONTINUED | OUTPATIENT
Start: 2019-11-19 | End: 2019-11-19

## 2019-11-19 RX ORDER — OXYTOCIN/RINGER'S LACTATE 30/500 ML
95 PLASTIC BAG, INJECTION (ML) INTRAVENOUS ONCE
Status: DISCONTINUED | OUTPATIENT
Start: 2019-11-19 | End: 2019-11-21 | Stop reason: HOSPADM

## 2019-11-19 RX ORDER — DOCUSATE SODIUM 100 MG/1
200 CAPSULE, LIQUID FILLED ORAL 2 TIMES DAILY PRN
Status: DISCONTINUED | OUTPATIENT
Start: 2019-11-19 | End: 2019-11-21 | Stop reason: HOSPADM

## 2019-11-19 RX ORDER — IBUPROFEN 600 MG/1
600 TABLET ORAL EVERY 6 HOURS
Status: DISCONTINUED | OUTPATIENT
Start: 2019-11-20 | End: 2019-11-21 | Stop reason: HOSPADM

## 2019-11-19 RX ORDER — ONDANSETRON 8 MG/1
8 TABLET, ORALLY DISINTEGRATING ORAL EVERY 8 HOURS PRN
Status: DISCONTINUED | OUTPATIENT
Start: 2019-11-19 | End: 2019-11-21 | Stop reason: HOSPADM

## 2019-11-19 RX ORDER — DIPHENHYDRAMINE HCL 25 MG
25 CAPSULE ORAL EVERY 4 HOURS PRN
Status: DISCONTINUED | OUTPATIENT
Start: 2019-11-19 | End: 2019-11-21 | Stop reason: HOSPADM

## 2019-11-19 NOTE — HOSPITAL COURSE
Admit for labor management, plans NCB  19  @ 2121, male, breast. First degree laceration  19-PPD1, routine PP care  19 discharge home

## 2019-11-19 NOTE — PROGRESS NOTES
S: Managing well with contractions  O:  VS reviewed, afebrile   Vitals:    19 0950 19 0954   BP: 129/81    Pulse: 68 83   Resp: 18    Temp: 98.4 °F (36.9 °C)    TempSrc: Oral    SpO2:  100%       FHTs:  CAT 1-2 reassuring  UC:  2-3  SVE deferred patient will let us know when she wants to be checked    A: IUP @ 40w6d ;     Patient Active Problem List   Diagnosis    H/O:     Elderly multigravida in second trimester    Normal intrauterine pregnancy, antepartum    Rh negative state in antepartum period    Glucose intolerance of pregnancy    Patient desires vaginal birth after  section ()       P:   Continue close monitoring of maternal/fetal status

## 2019-11-19 NOTE — TELEPHONE ENCOUNTER
Reason for Disposition   General information question, no triage required and triager able to answer question   Health Information question, no triage required and triager able to answer question    Protocols used: INFORMATION ONLY CALL-A-AH    Pt  called stated he just wanted to inform Md that pt is on her way to the hospital. Spouse said that his wife is in labor and their  told them to go in.

## 2019-11-19 NOTE — H&P
[facility] -   Obstetrics  History & Physical    Patient Name: Rafaela Mas  MRN: [id number]  Admission Date: 2019  Primary Care Provider: Primary Doctor No    Subjective:     Principal Problem:  Patient desires vaginal birth after     History of Present Illness:  Presents in labor  History previous  with successful  after  Desires     Obstetric HPI:  Patient reports  contractions, active fetal movement, No vaginal bleeding , No loss of fluid     This pregnancy has been complicated by Previous C/S desires .  Seeing multiple providers     OB History    Para Term  AB Living   3 2 2 0 0 2   SAB TAB Ectopic Multiple Live Births   0 0 0 0 2      # Outcome Date GA Lbr Cruz/2nd Weight Sex Delivery Anes PTL Lv   3 Current            2 Term 17 40w0d  3.884 kg (8 lb 9 oz) F Vag-Spont   GAYATRI   1 Term 14 40w0d  2.41 kg (5 lb 5 oz) F CS-Unspec   GAYATRI     History reviewed. No pertinent past medical history.  Past Surgical History:   Procedure Laterality Date     SECTION         PTA Medications   Medication Sig    PNV,calcium 72-iron-folic acid (PRENATAL VITAMIN PLUS LOW IRON) 27 mg iron- 1 mg Tab        Review of patient's allergies indicates:   Allergen Reactions    Allergenic extracts         Family History     None        Tobacco Use    Smoking status: Never Smoker    Smokeless tobacco: Never Used   Substance and Sexual Activity    Alcohol use: Not Currently    Drug use: Never    Sexual activity: Not Currently     Partners: Male     Review of Systems   Gastrointestinal: Positive for abdominal pain.   Genitourinary: Positive for pelvic pain.   All other systems reviewed and are negative.     Objective:     Vital Signs (Most Recent):    Vital Signs (24h Range):           There is no height or weight on file to calculate BMI.    FHT: Cat 1 (reassuring)  TOCO:  Q 2 minutes    Physical Exam:   Constitutional: She is oriented to person, place,  and time. She appears well-developed and well-nourished.        Pulmonary/Chest: Effort normal.        Abdominal: Soft.     Genitourinary: Vagina normal and uterus normal.           Musculoskeletal: Normal range of motion and moves all extremeties.       Neurological: She is alert and oriented to person, place, and time.    Skin: Skin is warm and dry.    Psychiatric: She has a normal mood and affect. Her behavior is normal.       Cervix:  Dilation:  5-6  Effacement:  80  Station: -2  Presentation: Vertex     Significant Labs:  Lab Results   Component Value Date    GROUPTRH O NEG 2019    HEPBSAG Negative 2019    STREPBCULT No Group B Streptococcus isolated 10/16/2019       I have personallly reviewed all pertinent lab results from the last 24 hours.    Assessment/Plan:     39 y.o. female  at 40w6d for:    Patient desires vaginal birth after  section ()  Admit for labor management, desires SYLVIE Vences CNM  Obstetrics  [facility]

## 2019-11-19 NOTE — SUBJECTIVE & OBJECTIVE
Obstetric HPI:  Patient reports  contractions, active fetal movement, No vaginal bleeding , No loss of fluid     This pregnancy has been complicated by Previous C/S desires .  Seeing multiple providers     OB History    Para Term  AB Living   3 2 2 0 0 2   SAB TAB Ectopic Multiple Live Births   0 0 0 0 2      # Outcome Date GA Lbr Cruz/2nd Weight Sex Delivery Anes PTL Lv   3 Current            2 Term 17 40w0d  3.884 kg (8 lb 9 oz) F Vag-Spont   GAYATRI   1 Term 14 40w0d  2.41 kg (5 lb 5 oz) F CS-Unspec   GAYATRI     History reviewed. No pertinent past medical history.  Past Surgical History:   Procedure Laterality Date     SECTION         PTA Medications   Medication Sig    PNV,calcium 72-iron-folic acid (PRENATAL VITAMIN PLUS LOW IRON) 27 mg iron- 1 mg Tab        Review of patient's allergies indicates:   Allergen Reactions    Allergenic extracts         Family History     None        Tobacco Use    Smoking status: Never Smoker    Smokeless tobacco: Never Used   Substance and Sexual Activity    Alcohol use: Not Currently    Drug use: Never    Sexual activity: Not Currently     Partners: Male     Review of Systems   Gastrointestinal: Positive for abdominal pain.   Genitourinary: Positive for pelvic pain.   All other systems reviewed and are negative.     Objective:     Vital Signs (Most Recent):    Vital Signs (24h Range):           There is no height or weight on file to calculate BMI.    FHT: Cat 1 (reassuring)  TOCO:  Q 2 minutes    Physical Exam:   Constitutional: She is oriented to person, place, and time. She appears well-developed and well-nourished.        Pulmonary/Chest: Effort normal.        Abdominal: Soft.     Genitourinary: Vagina normal and uterus normal.           Musculoskeletal: Normal range of motion and moves all extremeties.       Neurological: She is alert and oriented to person, place, and time.    Skin: Skin is warm and dry.    Psychiatric: She has a  normal mood and affect. Her behavior is normal.       Cervix:  Dilation:  5-6  Effacement:  80  Station: -2  Presentation: Vertex     Significant Labs:  Lab Results   Component Value Date    GROUPTRH O NEG 08/28/2019    HEPBSAG Negative 08/28/2019    STREPBCULT No Group B Streptococcus isolated 10/16/2019       I have personallly reviewed all pertinent lab results from the last 24 hours.

## 2019-11-20 LAB
ABO + RH BLD: NORMAL
BASOPHILS # BLD AUTO: 0.01 K/UL (ref 0–0.2)
BASOPHILS # BLD AUTO: 0.02 K/UL (ref 0–0.2)
BASOPHILS NFR BLD: 0.1 % (ref 0–1.9)
BASOPHILS NFR BLD: 0.1 % (ref 0–1.9)
DIFFERENTIAL METHOD: ABNORMAL
DIFFERENTIAL METHOD: ABNORMAL
EOSINOPHIL # BLD AUTO: 0 K/UL (ref 0–0.5)
EOSINOPHIL # BLD AUTO: 0 K/UL (ref 0–0.5)
EOSINOPHIL NFR BLD: 0 % (ref 0–8)
EOSINOPHIL NFR BLD: 0.1 % (ref 0–8)
ERYTHROCYTE [DISTWIDTH] IN BLOOD BY AUTOMATED COUNT: 12.6 % (ref 11.5–14.5)
ERYTHROCYTE [DISTWIDTH] IN BLOOD BY AUTOMATED COUNT: 13 % (ref 11.5–14.5)
FETAL CELL SCN BLD QL ROSETTE: NORMAL
HCT VFR BLD AUTO: 32.9 % (ref 37–48.5)
HCT VFR BLD AUTO: 33.9 % (ref 37–48.5)
HGB BLD-MCNC: 11.4 G/DL (ref 12–16)
HGB BLD-MCNC: 11.4 G/DL (ref 12–16)
IMM GRANULOCYTES # BLD AUTO: 0.1 K/UL (ref 0–0.04)
IMM GRANULOCYTES # BLD AUTO: 0.16 K/UL (ref 0–0.04)
IMM GRANULOCYTES NFR BLD AUTO: 0.7 % (ref 0–0.5)
IMM GRANULOCYTES NFR BLD AUTO: 0.8 % (ref 0–0.5)
INJECT RH IG VOL PATIENT: NORMAL ML
LYMPHOCYTES # BLD AUTO: 1.3 K/UL (ref 1–4.8)
LYMPHOCYTES # BLD AUTO: 1.6 K/UL (ref 1–4.8)
LYMPHOCYTES NFR BLD: 11.1 % (ref 18–48)
LYMPHOCYTES NFR BLD: 6.4 % (ref 18–48)
MCH RBC QN AUTO: 30.6 PG (ref 27–31)
MCH RBC QN AUTO: 30.8 PG (ref 27–31)
MCHC RBC AUTO-ENTMCNC: 33.6 G/DL (ref 32–36)
MCHC RBC AUTO-ENTMCNC: 34.7 G/DL (ref 32–36)
MCV RBC AUTO: 89 FL (ref 82–98)
MCV RBC AUTO: 91 FL (ref 82–98)
MONOCYTES # BLD AUTO: 0.8 K/UL (ref 0.3–1)
MONOCYTES # BLD AUTO: 0.9 K/UL (ref 0.3–1)
MONOCYTES NFR BLD: 4 % (ref 4–15)
MONOCYTES NFR BLD: 6.5 % (ref 4–15)
NEUTROPHILS # BLD AUTO: 11.5 K/UL (ref 1.8–7.7)
NEUTROPHILS # BLD AUTO: 18 K/UL (ref 1.8–7.7)
NEUTROPHILS NFR BLD: 81.5 % (ref 38–73)
NEUTROPHILS NFR BLD: 88.7 % (ref 38–73)
NRBC BLD-RTO: 0 /100 WBC
NRBC BLD-RTO: 0 /100 WBC
PLATELET # BLD AUTO: 185 K/UL (ref 150–350)
PLATELET # BLD AUTO: 205 K/UL (ref 150–350)
PMV BLD AUTO: 10.3 FL (ref 9.2–12.9)
PMV BLD AUTO: 9.9 FL (ref 9.2–12.9)
RBC # BLD AUTO: 3.7 M/UL (ref 4–5.4)
RBC # BLD AUTO: 3.72 M/UL (ref 4–5.4)
WBC # BLD AUTO: 14.07 K/UL (ref 3.9–12.7)
WBC # BLD AUTO: 20.3 K/UL (ref 3.9–12.7)

## 2019-11-20 PROCEDURE — 25000003 PHARM REV CODE 250: Performed by: ADVANCED PRACTICE MIDWIFE

## 2019-11-20 PROCEDURE — 85025 COMPLETE CBC W/AUTO DIFF WBC: CPT | Mod: 91

## 2019-11-20 PROCEDURE — 99231 PR SUBSEQUENT HOSPITAL CARE,LEVL I: ICD-10-PCS | Mod: ,,, | Performed by: ADVANCED PRACTICE MIDWIFE

## 2019-11-20 PROCEDURE — 36415 COLL VENOUS BLD VENIPUNCTURE: CPT

## 2019-11-20 PROCEDURE — 86901 BLOOD TYPING SEROLOGIC RH(D): CPT

## 2019-11-20 PROCEDURE — 11000001 HC ACUTE MED/SURG PRIVATE ROOM

## 2019-11-20 PROCEDURE — 63600519 RHOGAM PHARM REV CODE 636 ALT 250 W HCPCS: Performed by: ADVANCED PRACTICE MIDWIFE

## 2019-11-20 PROCEDURE — 85461 HEMOGLOBIN FETAL: CPT

## 2019-11-20 PROCEDURE — 99231 SBSQ HOSP IP/OBS SF/LOW 25: CPT | Mod: ,,, | Performed by: ADVANCED PRACTICE MIDWIFE

## 2019-11-20 RX ADMIN — IBUPROFEN 600 MG: 600 TABLET, FILM COATED ORAL at 11:11

## 2019-11-20 RX ADMIN — IBUPROFEN 600 MG: 600 TABLET, FILM COATED ORAL at 12:11

## 2019-11-20 RX ADMIN — IBUPROFEN 600 MG: 600 TABLET, FILM COATED ORAL at 05:11

## 2019-11-20 RX ADMIN — HUMAN RHO(D) IMMUNE GLOBULIN 300 MCG: 300 INJECTION, SOLUTION INTRAMUSCULAR at 05:11

## 2019-11-20 NOTE — L&D DELIVERY NOTE
[facility]  Vaginal Delivery   Operative Note    SUMMARY     Normal spontaneous  delivery of live infant, was placed on mothers abdomen for skin to skin and bulb suctioning performed.  Infant delivered position OA over perineum.  Nuchal cord: Yes, cord reduced at perineum.    Spontaneous delivery of placenta and IM pitocin and 800 cytotec PO  given noting good uterine tone.  1st degree laceration noted. Perineum.  2% lidocaine injected as well as lidocaine jelly used to assist with repair.  2.0 vicryl used x's 1  Patient tolerated delivery well. Sponge needle and lap counted correctly x2.    Indications: Patient desires vaginal birth after  section ()  Pregnancy complicated by:   Patient Active Problem List   Diagnosis    H/O:     Elderly multigravida in second trimester    Normal intrauterine pregnancy, antepartum    Rh negative state in antepartum period    Glucose intolerance of pregnancy    Patient desires vaginal birth after  section ()     Admitting GA: 40w6d    Delivery Information for Cecilio Mas    Birth information:  YOB: 2021   Time of birth: 9:21 PM   Sex: male   Head Delivery Date/Time: 2019  9:21 PM   Delivery type: Vaginal, Spontaneous   Gestational Age: 40w6d    Delivery Providers    Delivering clinician:  Josephine Vences CNM   Provider Role    Doris Larsen RN Registered Nurse    Carmine Fagan RN Registered Nurse    Xander Rodriguez [facility]            Measurements    Weight:    Length:           Apgars    Living status:  Living  Apgars:   1 min.:   5 min.:   10 min.:   15 min.:   20 min.:     Skin color:   1  1       Heart rate:   2  2       Reflex irritability:   2  2       Muscle tone:   2  2       Respiratory effort:   2  2       Total:   9  9       Apgars assigned by:  NICOLE FAGAN RN         Operative Delivery    Forceps attempted?:  No  Vacuum extractor attempted?:  No         Shoulder Dystocia     Shoulder dystocia present?:  No           Presentation    Presentation:  Vertex  Position:  Occiput Anterior           Interventions/Resuscitation    Method:  Bulb Suctioning, Tactile Stimulation       Cord    Vessels:  3 vessels  Complications:  None  Delayed Cord Clamping?:  Yes  Cord Clamped Date/Time:  2019  9:29 PM  Cord Blood Disposition:  Lab  Gases Sent?:  No  Stem Cell Collection (by MD):  No       Placenta    Placenta delivery date/time:  2019  Placenta removal:  Spontaneous  Placenta appearance:  Intact  Placenta disposition:  discarded           Labor Events:       labor: No     Labor Onset Date/Time:         Dilation Complete Date/Time:         Start Pushing Date/Time:       Rupture Date/Time:              Rupture type:           Fluid Amount:        Fluid Color:        Fluid Odor:        Membrane Status (PeriCalm):        Rupture Date/Time (PeriCalm):        Fluid Amount (PeriCalm):        Fluid Color (PeriCalm):         steroids: None     Antibiotics given for GBS: No     Induction:       Indications for induction:        Augmentation: amniotomy     Indications for augmentation: Ineffective Contraction Pattern     Labor complications: None     Additional complications:          Cervical ripening:                     Delivery:      Episiotomy: None     Indication for Episiotomy:       Perineal Lacerations: 1st Repaired:  Yes   Periurethral Laceration:   Repaired:     Labial Laceration:   Repaired:     Sulcus Laceration:   Repaired:     Vaginal Laceration:   Repaired:     Cervical Laceration:   Repaired:     Repair suture:       Repair # of packets: 1     Last Value - EBL - Nursing (mL): 300     Sum - EBL - Nursing (mL): 300     Last Value - EBL - Anesthesia (mL):      Calculated QBL (mL):        Vaginal Sweep Performed: Yes     Surgicount Correct: Yes       Other providers:       Anesthesia    Method:  None          Details (if applicable):  Trial of Labor       Categorization:      Priority:     Indications for :     Incision Type:       Additional  information:  Forceps:    Vacuum:    Breech:    Observed anomalies    Other (Comments):

## 2019-11-20 NOTE — SUBJECTIVE & OBJECTIVE
Hospital course: Admit for labor management, plans NCB  19  @ 2121, male, breast. First degree laceration  19-PPD1, routine PP care    Interval History:     She is doing well this morning. She is tolerating a regular diet without nausea or vomiting. She is voiding spontaneously. She is ambulating. She has passed flatus, and has not a BM. Vaginal bleeding is mild. She denies fever or chills. Abdominal pain is moderate and controlled with oral medications. She is breastfeeding.     Objective:     Vital Signs (Most Recent):  Temp: 98.9 °F (37.2 °C) (19 0500)  Pulse: 93 (19 0500)  Resp: 20 (19 0500)  BP: (!) 102/55 (19 0500)  SpO2: 100 % (19 0954) Vital Signs (24h Range):  Temp:  [97.8 °F (36.6 °C)-99 °F (37.2 °C)] 98.9 °F (37.2 °C)  Pulse:  [] 93  Resp:  [18-20] 20  BP: ()/(50-82) 102/55        There is no height or weight on file to calculate BMI.      Intake/Output Summary (Last 24 hours) at 2019 0957  Last data filed at 2019 212  Gross per 24 hour   Intake --   Output 300 ml   Net -300 ml       Significant Labs:  Lab Results   Component Value Date    GROUPTRH O NEG 2019    HEPBSAG Negative 2019    STREPBCULT No Group B Streptococcus isolated 10/16/2019     Recent Labs   Lab 19  0134   HGB 11.4*   HCT 32.9*       I have personallly reviewed all pertinent lab results from the last 24 hours.    Physical Exam:   Constitutional: She is oriented to person, place, and time. She appears well-developed and well-nourished.    HENT:   Head: Normocephalic.     Neck: Normal range of motion.    Cardiovascular: Normal rate and regular rhythm.     Pulmonary/Chest: Effort normal.        Abdominal: Soft.     Genitourinary: Vagina normal and uterus normal.   Genitourinary Comments: Cantaloupe size clot noted in toilet. Uterus firm and at the U. Bleeding scant.             Musculoskeletal: Normal range of motion and moves all extremeties.        Neurological: She is alert and oriented to person, place, and time.    Skin: Skin is warm and dry.

## 2019-11-20 NOTE — PROGRESS NOTES
[facility] -   Obstetrics  Postpartum Progress Note    Patient Name: Rafaela Mas  MRN: [id number]  Admission Date: 2019  Hospital Length of Stay: 1 days  Attending Physician: Maureen Mane MD  Primary Care Provider: Primary Doctor No    Subjective:     Principal Problem:Patient desires vaginal birth after  section ()    Hospital course: Admit for labor management, plans NCB  19  @ 2121, male, breast. First degree laceration  19-PPD1, routine PP care    Interval History:     She is doing well this morning. She is tolerating a regular diet without nausea or vomiting. She is voiding spontaneously. She is ambulating. She has passed flatus, and has not a BM. Vaginal bleeding is mild. She denies fever or chills. Abdominal pain is moderate and controlled with oral medications. She is breastfeeding.     Objective:     Vital Signs (Most Recent):  Temp: 98.9 °F (37.2 °C) (19 0500)  Pulse: 93 (19 0500)  Resp: 20 (19 0500)  BP: (!) 102/55 (19 0500)  SpO2: 100 % (19 0954) Vital Signs (24h Range):  Temp:  [97.8 °F (36.6 °C)-99 °F (37.2 °C)] 98.9 °F (37.2 °C)  Pulse:  [] 93  Resp:  [18-20] 20  BP: ()/(50-82) 102/55        There is no height or weight on file to calculate BMI.      Intake/Output Summary (Last 24 hours) at 2019 0957  Last data filed at 2019 2121  Gross per 24 hour   Intake --   Output 300 ml   Net -300 ml       Significant Labs:  Lab Results   Component Value Date    GROUPTRH O NEG 2019    HEPBSAG Negative 2019    STREPBCULT No Group B Streptococcus isolated 10/16/2019     Recent Labs   Lab 19  0134   HGB 11.4*   HCT 32.9*       I have personallly reviewed all pertinent lab results from the last 24 hours.    Physical Exam:   Constitutional: She is oriented to person, place, and time. She appears well-developed and well-nourished.    HENT:   Head: Normocephalic.     Neck: Normal range of motion.     Cardiovascular: Normal rate and regular rhythm.     Pulmonary/Chest: Effort normal.        Abdominal: Soft.     Genitourinary: Vagina normal and uterus normal.   Genitourinary Comments: Cantaloupe size clot noted in toilet. Uterus firm and at the U. Bleeding scant.             Musculoskeletal: Normal range of motion and moves all extremeties.       Neurological: She is alert and oriented to person, place, and time.    Skin: Skin is warm and dry.        Assessment/Plan:     39 y.o. female  for:    * Patient desires vaginal birth after  section ()  Admit for labor management, desires NCB    , delivered, current hospitalization  Routine PP care    Obstetrical laceration, first degree  Routine pipe-care        Disposition: As patient meets milestones, will plan to discharge tomorrow.    Eulalia Huerta CNM  Obstetrics  [facility] [place]

## 2019-11-20 NOTE — NURSING
Pt. Up to bathroom with assist x2 pt. States she is feeling light headed placed in wheelchair and wheeled to and from bathroom. Pt back in bed and educated not to get up without assistance. Pt verbalized understanding.

## 2019-11-20 NOTE — NURSING
Pt got up to restroom without assistance. Called for nurse because she was passing clots and feeling lightheaded. Pt assisted back to bed with assist x2. VSS, pt states she feels better after laying down.

## 2019-11-21 VITALS
RESPIRATION RATE: 18 BRPM | TEMPERATURE: 98 F | DIASTOLIC BLOOD PRESSURE: 71 MMHG | HEART RATE: 81 BPM | OXYGEN SATURATION: 100 % | SYSTOLIC BLOOD PRESSURE: 128 MMHG

## 2019-11-21 PROBLEM — O34.219 PATIENT DESIRES VAGINAL BIRTH AFTER CESAREAN SECTION (VBAC): Status: RESOLVED | Noted: 2019-11-19 | Resolved: 2019-11-21

## 2019-11-21 PROCEDURE — 99238 PR HOSPITAL DISCHARGE DAY,<30 MIN: ICD-10-PCS | Mod: ,,, | Performed by: ADVANCED PRACTICE MIDWIFE

## 2019-11-21 PROCEDURE — 25000003 PHARM REV CODE 250: Performed by: ADVANCED PRACTICE MIDWIFE

## 2019-11-21 PROCEDURE — 99238 HOSP IP/OBS DSCHRG MGMT 30/<: CPT | Mod: ,,, | Performed by: ADVANCED PRACTICE MIDWIFE

## 2019-11-21 RX ADMIN — IBUPROFEN 600 MG: 600 TABLET, FILM COATED ORAL at 06:11

## 2019-11-21 NOTE — PLAN OF CARE
VSS. Patient is has been ambulating and up to bathroom without difficulty. Pain is controlled with oral pain medications. Breastfeeding without difficulty. Bonding well with infant

## 2019-11-21 NOTE — PROGRESS NOTES
[facility] -   Obstetrics  Postpartum Progress Note    Patient Name: Rafaela Mas  MRN: [id number]  Admission Date: 2019  Hospital Length of Stay: 2 days  Attending Physician: Maureen Mane MD  Primary Care Provider: Primary Doctor No    Subjective:     Principal Problem:Patient desires vaginal birth after  section ()    Hospital course: Admit for labor management, plans NCB  19  @ 2121, male, breast. First degree laceration  19-PPD1, routine PP care  19 discharge home    Interval History:     She is doing well this morning. She is tolerating a regular diet without nausea or vomiting. She is voiding spontaneously. She is ambulating. She has passed flatus, and has a BM. Vaginal bleeding is mild. She denies fever or chills. Abdominal pain is mild and controlled with oral medications. She is breastfeeding. She desires circumcision for her male baby: no.    Objective:     Vital Signs (Most Recent):  Temp: 98.2 °F (36.8 °C) (19 0700)  Pulse: 74 (19 0700)  Resp: 18 (19 0700)  BP: 115/62 (19 0700)  SpO2: 100 % (19 0954) Vital Signs (24h Range):  Temp:  [98.2 °F (36.8 °C)-98.6 °F (37 °C)] 98.2 °F (36.8 °C)  Pulse:  [71-95] 74  Resp:  [16-18] 18  BP: (102-115)/(54-62) 115/62        There is no height or weight on file to calculate BMI.    No intake or output data in the 24 hours ending 19 0858    Significant Labs:  Lab Results   Component Value Date    GROUPTRH O NEG 2019    HEPBSAG Negative 2019    STREPBCULT No Group B Streptococcus isolated 10/16/2019     Recent Labs   Lab 19  1010   HGB 11.4*   HCT 33.9*       I have personallly reviewed all pertinent lab results from the last 24 hours.    Physical Exam:   Constitutional: She is oriented to person, place, and time. She appears well-developed and well-nourished.     Eyes: Pupils are equal, round, and reactive to light. Conjunctivae are normal.    Neck: Normal  range of motion.    Cardiovascular: Normal rate and regular rhythm.     Pulmonary/Chest: Breath sounds normal.        Abdominal: Soft.     Genitourinary: Vagina normal and uterus normal.           Musculoskeletal: Normal range of motion and moves all extremeties.       Neurological: She is alert and oriented to person, place, and time.    Skin: Skin is warm.    Psychiatric: She has a normal mood and affect. Her behavior is normal. Thought content normal.       Assessment/Plan:     39 y.o. female  for:    * Patient desires vaginal birth after  section ()  Admit for labor management, desires NCB    , delivered, current hospitalization  Routine PP care  Lactation consult  Discharge home plans mini pills    Obstetrical laceration, first degree  Routine pipe-care        Disposition: As patient meets milestones, will plan to discharge today    Kati Dennison CNM  Obstetrics  [facility] [place]

## 2019-11-21 NOTE — DISCHARGE INSTRUCTIONS
Mother Self Care:    Activity: Avoid strenuous exercise and get adequate rest.  No driving until your physician gives you consent.  Emotional Changes: The grieving process has many different stages, be prepared to experience lots of emotional ups and downs. Identify people to be your support system, and do not hesitate to call our  if you need someone to talk to.   Breast Care: You may notice milk leaking from your breasts. Wear a support bra 24 hours a day for one week or wrap breasts in an ace bandage if needed to stop milk production.  Avoid stimulation to breasts.  You may use ice packs for discomfort.  Pipe-Care/Vaginal Bleeding: Remember to use your pipe-bottle after urinating.  Your flow will change from red, to pink, to yellow/white color over a period of 2 weeks.  Menstruation will return in 3-8 weeks.  Episiotomy Vaginal Delivery: Stitches will dissolve within 10 days to 3 weeks.  Warm baths, tucks, and dermoplast spray will promote healing.  Avoid bubble baths or strong soaps.  Sexual Activity/Pelvic Rest: No sexual activity, tampons, or douching until your physician gives you consent.  Diet: Continue to eat from the five basic food groups, including plenty of protein, fruits, vegetables, and whole grains.  Limit empty calories and high fat foods.  Drink enough fluids to satisfy thirst.  Constipation/Hemorrhoids: Drink plenty of water.  You may take a stool softener or natural laxative (Metamucil). You may use tucks or hemorrhoid ointment and soak in a warm tub.    CALL YOUR OB DOCTOR IF ANY OF THE FOLLOWING OCCURS:  *Heavy bleeding - saturating a pad an hour or passing any large (2-3 inches in size) blood clots.  *Any pain, redness, or tenderness in lower leg.  *You cannot care for yourself  *Any signs of infection-      - Temperature greater than 100.5 degrees F      - Foul smelling vaginal discharge and/or incisional drainage      - Increased episiotomy or incisional pain      - Hot, hard,  red or sore area on breast      - Flu-like symptoms      - Any urgency, frequency or burning with urination

## 2019-11-21 NOTE — SUBJECTIVE & OBJECTIVE
Hospital course: Admit for labor management, plans NCB  19  @ 2121, male, breast. First degree laceration  19-PPD1, routine PP care  19 discharge home    Interval History:     She is doing well this morning. She is tolerating a regular diet without nausea or vomiting. She is voiding spontaneously. She is ambulating. She has passed flatus, and has a BM. Vaginal bleeding is mild. She denies fever or chills. Abdominal pain is mild and controlled with oral medications. She is breastfeeding. She desires circumcision for her male baby: no.    Objective:     Vital Signs (Most Recent):  Temp: 98.2 °F (36.8 °C) (19 0700)  Pulse: 74 (19 0700)  Resp: 18 (19 0700)  BP: 115/62 (19 0700)  SpO2: 100 % (19 0954) Vital Signs (24h Range):  Temp:  [98.2 °F (36.8 °C)-98.6 °F (37 °C)] 98.2 °F (36.8 °C)  Pulse:  [71-95] 74  Resp:  [16-18] 18  BP: (102-115)/(54-62) 115/62        There is no height or weight on file to calculate BMI.    No intake or output data in the 24 hours ending 19 0858    Significant Labs:  Lab Results   Component Value Date    GROUPTRH O NEG 2019    HEPBSAG Negative 2019    STREPBCULT No Group B Streptococcus isolated 10/16/2019     Recent Labs   Lab 19  1010   HGB 11.4*   HCT 33.9*       I have personallly reviewed all pertinent lab results from the last 24 hours.    Physical Exam:   Constitutional: She is oriented to person, place, and time. She appears well-developed and well-nourished.     Eyes: Pupils are equal, round, and reactive to light. Conjunctivae are normal.    Neck: Normal range of motion.    Cardiovascular: Normal rate and regular rhythm.     Pulmonary/Chest: Breath sounds normal.        Abdominal: Soft.     Genitourinary: Vagina normal and uterus normal.           Musculoskeletal: Normal range of motion and moves all extremeties.       Neurological: She is alert and oriented to person, place, and time.    Skin: Skin is warm.     Psychiatric: She has a normal mood and affect. Her behavior is normal. Thought content normal.

## 2019-11-21 NOTE — DISCHARGE SUMMARY
[facility] -   Obstetrics  Discharge Summary      Patient Name: Rafaela Mas  MRN: [id number]  Admission Date: 2019  Hospital Length of Stay: 2 days  Discharge Date and Time:  2019 4:52 PM  Attending Physician: No att. providers found   Discharging Provider: Kati Dennison CNM   Primary Care Provider: Primary Doctor No      * No surgery found *     Hospital Course:   Admit for labor management, plans NCB  19  @ 2121, male, breast. First degree laceration  19-PPD1, routine PP care  19 discharge home         Final Active Diagnoses:    Diagnosis Date Noted POA    PRINCIPAL PROBLEM:  , delivered, current hospitalization [O34.219] 2019 Yes    Normal  (single liveborn) [Z38.2] 2019 No    Obstetrical laceration, first degree [O70.0] 2019 No      Problems Resolved During this Admission:    Diagnosis Date Noted Date Resolved POA    Patient desires vaginal birth after  section () [O34.219] 2019 Yes        Labs:   CBC   Recent Labs   Lab 19  0134 19  1010   WBC 20.30* 14.07*   HGB 11.4* 11.4*   HCT 32.9* 33.9*    205       Feeding Method: breast    Immunizations     Date Immunization Status Dose Route/Site Given by    19 1006 MMR Deferred 0.5 mL Subcutaneous/Left deltoid Ellie Penny RN    19 1006 Tdap Deferred 0.5 mL Intramuscular/Left deltoid Ellie Penny RN    19 1713 Rho (D) Immune Globulin - IM Given 300 mcg Intramuscular/Left Ventrogluteal Ellie Penny RN          Delivery:    Episiotomy: None   Lacerations: 1st   Repair suture:     Repair # of packets: 1   Blood loss (ml): 300     Birth information:  YOB: 2021   Time of birth: 9:21 PM   Sex: male   Delivery type: Vaginal, Spontaneous   Gestational Age: 40w6d    Delivery Clinician:      Other providers:       Additional  information:  Forceps:    Vacuum:    Breech:    Observed anomalies      Living?:            APGARS  One minute Five minutes Ten minutes   Skin color:         Heart rate:         Grimace:         Muscle tone:         Breathing:         Totals: 9  9        Placenta: Delivered:       appearance    Pending Diagnostic Studies:     None          Discharged Condition: good    Disposition: Home or Self Care    Follow Up:    Patient Instructions:      Activity as tolerated     Medications:  Discharge Medication List as of 11/21/2019 10:07 AM      CONTINUE these medications which have NOT CHANGED    Details   PNV,calcium 72-iron-folic acid (PRENATAL VITAMIN PLUS LOW IRON) 27 mg iron- 1 mg Tab Historical Med             Kati Dennison CNM  Obstetrics  [facility] [place]

## 2019-12-23 ENCOUNTER — POSTPARTUM VISIT (OUTPATIENT)
Dept: OBSTETRICS AND GYNECOLOGY | Facility: CLINIC | Age: 39
End: 2019-12-23
Payer: MEDICAID

## 2019-12-23 VITALS
DIASTOLIC BLOOD PRESSURE: 80 MMHG | HEIGHT: 66 IN | SYSTOLIC BLOOD PRESSURE: 136 MMHG | WEIGHT: 161 LBS | BODY MASS INDEX: 25.88 KG/M2

## 2019-12-23 DIAGNOSIS — Z12.4 ENCOUNTER FOR PAPANICOLAOU SMEAR FOR CERVICAL CANCER SCREENING: ICD-10-CM

## 2019-12-23 PROBLEM — O34.219 VBAC, DELIVERED, CURRENT HOSPITALIZATION: Status: RESOLVED | Noted: 2019-11-19 | Resolved: 2019-12-23

## 2019-12-23 PROBLEM — O26.899 RH NEGATIVE STATE IN ANTEPARTUM PERIOD: Status: RESOLVED | Noted: 2019-08-06 | Resolved: 2019-12-23

## 2019-12-23 PROBLEM — Z67.91 RH NEGATIVE STATE IN ANTEPARTUM PERIOD: Status: RESOLVED | Noted: 2019-08-06 | Resolved: 2019-12-23

## 2019-12-23 PROCEDURE — 87624 HPV HI-RISK TYP POOLED RSLT: CPT

## 2019-12-23 PROCEDURE — 99213 OFFICE O/P EST LOW 20 MIN: CPT | Mod: PBBFAC,PO | Performed by: ADVANCED PRACTICE MIDWIFE

## 2019-12-23 PROCEDURE — 99999 PR PBB SHADOW E&M-EST. PATIENT-LVL III: CPT | Mod: PBBFAC,,, | Performed by: ADVANCED PRACTICE MIDWIFE

## 2019-12-23 PROCEDURE — 59430 PR CARE AFTER DELIVERY ONLY: ICD-10-PCS | Mod: TH,S$PBB,, | Performed by: ADVANCED PRACTICE MIDWIFE

## 2019-12-23 PROCEDURE — 88175 CYTOPATH C/V AUTO FLUID REDO: CPT

## 2019-12-23 PROCEDURE — 99999 PR PBB SHADOW E&M-EST. PATIENT-LVL III: ICD-10-PCS | Mod: PBBFAC,,, | Performed by: ADVANCED PRACTICE MIDWIFE

## 2019-12-23 RX ORDER — ACETAMINOPHEN AND CODEINE PHOSPHATE 120; 12 MG/5ML; MG/5ML
1 SOLUTION ORAL DAILY
Qty: 28 TABLET | Refills: 11 | Status: SHIPPED | OUTPATIENT
Start: 2019-12-23 | End: 2022-10-04

## 2019-12-23 NOTE — PROGRESS NOTES
39 y.o. female for postpartum visit.  Patient has no complaints.  Her delivery records were reviewed.  She is breast feeding infant.    Exam  General - well appearing, no apparent distress  Abdomen - soft, non tender, non distended incision none.  Pelvic - normal external genitalia, any lacerations well healed, one loose suture remain, pap collected               uterus non tender, appropriately sized  Extremeties - no edema    Assessment:  Encounter Diagnoses   Name Primary?    Routine postpartum follow-up Yes    Encounter for Papanicolaou smear for cervical cancer screening         F/u -breastfeeding, desires POP, rx micronor sent, use and SE discussed, will notify us when weaning baby, pap collected today, freq reviewed.  return for annual exams. al

## 2019-12-31 LAB
HPV HR 12 DNA SPEC QL NAA+PROBE: NEGATIVE
HPV16 AG SPEC QL: NEGATIVE
HPV18 DNA SPEC QL NAA+PROBE: NEGATIVE

## 2020-01-11 LAB
FINAL PATHOLOGIC DIAGNOSIS: NORMAL
Lab: NORMAL

## 2022-09-20 ENCOUNTER — TELEPHONE (OUTPATIENT)
Dept: OBSTETRICS AND GYNECOLOGY | Facility: CLINIC | Age: 42
End: 2022-09-20
Payer: MEDICAID

## 2022-09-20 NOTE — TELEPHONE ENCOUNTER
----- Message from Luana Hager RN sent at 9/20/2022  1:10 PM CDT -----  Regarding: [facility] ER visit f/u 9/19/2022 Dx: Possible fetal demise  Please call patient to schedule a close follow up appointment for repeat BHCG within 48-72 hours. She has seen Dr Nye prior.     Thanks,    Luana Hager RN, BSN  ED Navigator/Case Management  [phone]

## 2022-09-20 NOTE — TELEPHONE ENCOUNTER
----- Message from Luana Hager RN sent at 9/20/2022  1:10 PM CDT -----  Regarding: [facility] ER visit f/u 9/19/2022 Dx: Possible fetal demise  Please call patient to schedule a close follow up appointment for repeat BHCG within 48-72 hours. She has seen Dr Nye prior.     Thanks,    Luana Hager RN, BSN  ED Navigator/Case Management  [phone]

## 2022-09-22 ENCOUNTER — TELEPHONE (OUTPATIENT)
Dept: OBSTETRICS AND GYNECOLOGY | Facility: CLINIC | Age: 42
End: 2022-09-22
Payer: MEDICAID

## 2022-09-22 NOTE — TELEPHONE ENCOUNTER
Attempted to contact patient. Left message.    ----- Message from Jeff Washington sent at 9/22/2022  8:36 AM CDT -----  Contact: Self  Type: Needs Medical Advice  Who Called:  Patient  Best Call Back Number: [phone]   Additional Information: Pt would like Dr to read labs and UA from Dr Romeo's office taken 9/21 Ph: [phone]

## 2022-09-26 ENCOUNTER — OFFICE VISIT (OUTPATIENT)
Dept: OBSTETRICS AND GYNECOLOGY | Facility: CLINIC | Age: 42
End: 2022-09-26
Payer: MEDICAID

## 2022-09-26 VITALS — DIASTOLIC BLOOD PRESSURE: 70 MMHG | BODY MASS INDEX: 22.92 KG/M2 | WEIGHT: 142 LBS | SYSTOLIC BLOOD PRESSURE: 116 MMHG

## 2022-09-26 DIAGNOSIS — O02.1 MISSED AB: Primary | ICD-10-CM

## 2022-09-26 LAB
B-HCG UR QL: NEGATIVE
CTP QC/QA: YES

## 2022-09-26 PROCEDURE — 99213 PR OFFICE/OUTPT VISIT, EST, LEVL III, 20-29 MIN: ICD-10-PCS | Mod: S$PBB,TH,, | Performed by: STUDENT IN AN ORGANIZED HEALTH CARE EDUCATION/TRAINING PROGRAM

## 2022-09-26 PROCEDURE — 3078F DIAST BP <80 MM HG: CPT | Mod: CPTII,,, | Performed by: STUDENT IN AN ORGANIZED HEALTH CARE EDUCATION/TRAINING PROGRAM

## 2022-09-26 PROCEDURE — 3078F PR MOST RECENT DIASTOLIC BLOOD PRESSURE < 80 MM HG: ICD-10-PCS | Mod: CPTII,,, | Performed by: STUDENT IN AN ORGANIZED HEALTH CARE EDUCATION/TRAINING PROGRAM

## 2022-09-26 PROCEDURE — 81025 URINE PREGNANCY TEST: CPT | Mod: PBBFAC,PN | Performed by: STUDENT IN AN ORGANIZED HEALTH CARE EDUCATION/TRAINING PROGRAM

## 2022-09-26 PROCEDURE — 3008F PR BODY MASS INDEX (BMI) DOCUMENTED: ICD-10-PCS | Mod: CPTII,,, | Performed by: STUDENT IN AN ORGANIZED HEALTH CARE EDUCATION/TRAINING PROGRAM

## 2022-09-26 PROCEDURE — 99213 OFFICE O/P EST LOW 20 MIN: CPT | Mod: PBBFAC,TH,PN | Performed by: STUDENT IN AN ORGANIZED HEALTH CARE EDUCATION/TRAINING PROGRAM

## 2022-09-26 PROCEDURE — 3074F SYST BP LT 130 MM HG: CPT | Mod: CPTII,,, | Performed by: STUDENT IN AN ORGANIZED HEALTH CARE EDUCATION/TRAINING PROGRAM

## 2022-09-26 PROCEDURE — 1159F PR MEDICATION LIST DOCUMENTED IN MEDICAL RECORD: ICD-10-PCS | Mod: CPTII,,, | Performed by: STUDENT IN AN ORGANIZED HEALTH CARE EDUCATION/TRAINING PROGRAM

## 2022-09-26 PROCEDURE — 99213 OFFICE O/P EST LOW 20 MIN: CPT | Mod: S$PBB,TH,, | Performed by: STUDENT IN AN ORGANIZED HEALTH CARE EDUCATION/TRAINING PROGRAM

## 2022-09-26 PROCEDURE — 99999 PR PBB SHADOW E&M-EST. PATIENT-LVL III: CPT | Mod: PBBFAC,,, | Performed by: STUDENT IN AN ORGANIZED HEALTH CARE EDUCATION/TRAINING PROGRAM

## 2022-09-26 PROCEDURE — 3074F PR MOST RECENT SYSTOLIC BLOOD PRESSURE < 130 MM HG: ICD-10-PCS | Mod: CPTII,,, | Performed by: STUDENT IN AN ORGANIZED HEALTH CARE EDUCATION/TRAINING PROGRAM

## 2022-09-26 PROCEDURE — 3008F BODY MASS INDEX DOCD: CPT | Mod: CPTII,,, | Performed by: STUDENT IN AN ORGANIZED HEALTH CARE EDUCATION/TRAINING PROGRAM

## 2022-09-26 PROCEDURE — 1159F MED LIST DOCD IN RCRD: CPT | Mod: CPTII,,, | Performed by: STUDENT IN AN ORGANIZED HEALTH CARE EDUCATION/TRAINING PROGRAM

## 2022-09-26 PROCEDURE — 99999 PR PBB SHADOW E&M-EST. PATIENT-LVL III: ICD-10-PCS | Mod: PBBFAC,,, | Performed by: STUDENT IN AN ORGANIZED HEALTH CARE EDUCATION/TRAINING PROGRAM

## 2022-09-26 NOTE — PROGRESS NOTES
History & Physical  Gynecology      SUBJECTIVE:     Chief Complaint: Establish Care and Threatened Miscarriage       History of Present Illness:  42 y.o.  presents today for ED f/u for missed AB.      went to ED with VB. CRL 8mm, no FHTs. Had f/u with other OB on , confirmed missed AB. Patient had heavy vaginal bleeding at home and passed tissue/clear sac. Since then bleeding has lessned. Occasional blood clots today but light bleeding. Planned pregnancy. Hx of CS x1 and then 2 .    No PMH.     O neg.     Pap NILM in 2019.      Review of patient's allergies indicates:   Allergen Reactions    Allergenic extracts        History reviewed. No pertinent past medical history.  Past Surgical History:   Procedure Laterality Date     SECTION       OB History          3    Para   2    Term   2            AB        Living   2         SAB        IAB        Ectopic        Multiple        Live Births   2               Family History   Problem Relation Age of Onset    Breast cancer Neg Hx     Ovarian cancer Neg Hx      Social History     Tobacco Use    Smoking status: Never    Smokeless tobacco: Never   Substance Use Topics    Alcohol use: Not Currently    Drug use: Never       Current Outpatient Medications   Medication Sig    PNV,calcium 72-iron-folic acid (PRENATAL VITAMIN PLUS LOW IRON) 27 mg iron- 1 mg Tab     norethindrone (ORTHO MICRONOR) 0.35 mg tablet Take 1 tablet (0.35 mg total) by mouth once daily.     No current facility-administered medications for this visit.         Review of Systems:  Review of Systems   Constitutional:  Negative for chills, fatigue and fever.   HENT:  Negative for congestion.    Eyes:  Negative for visual disturbance.   Respiratory:  Negative for cough and shortness of breath.    Cardiovascular:  Negative for chest pain and palpitations.   Gastrointestinal:  Negative for abdominal distention, abdominal pain, constipation, diarrhea, nausea and vomiting.    Genitourinary:  Positive for vaginal bleeding. Negative for difficulty urinating, dysuria, hematuria and vaginal discharge.   Skin:  Negative for rash.   Neurological:  Negative for dizziness, seizures, light-headedness and headaches.   Hematological:  Does not bruise/bleed easily.   Psychiatric/Behavioral:  Negative for dysphoric mood. The patient is not nervous/anxious.       OBJECTIVE:     Physical Exam:  Physical Exam  Vitals reviewed.   Constitutional:       General: She is not in acute distress.     Appearance: Normal appearance. She is well-developed.   HENT:      Head: Normocephalic and atraumatic.   Cardiovascular:      Rate and Rhythm: Normal rate and regular rhythm.   Pulmonary:      Effort: Pulmonary effort is normal.   Abdominal:      General: There is no distension.      Palpations: Abdomen is soft.   Genitourinary:     Vagina: Normal.      Comments: Exam deferred     Skin:     General: Skin is warm.   Neurological:      Mental Status: She is alert and oriented to person, place, and time.   Psychiatric:         Behavior: Behavior normal.         Thought Content: Thought content normal.         Judgment: Judgment normal.       ASSESSMENT:       ICD-10-CM ICD-9-CM    1. Missed ab  O02.1 632 POCT urine pregnancy      US Pelvis Comp with Transvag NON-OB (xpd          Orders Placed This Encounter   Procedures    US Pelvis Comp with Transvag NON-OB (xpd     Standing Status:   Future     Standing Expiration Date:   9/26/2023     Order Specific Question:   May the Radiologist modify the order per protocol to meet the clinical needs of the patient?     Answer:   Yes     Order Specific Question:   Release to patient     Answer:   Immediate    POCT urine pregnancy           Plan:      - UPT negative today  - light bleeding, deferred exam today  - will get TVUS to assess stripe  - Discussed normal pregnancy loss implications  - declines birth control. Thinking about getting pregnant. Continue taking PNV.  Discussed risk of aneuploidy in her age.     F/u in 2 months for annual    Roughly 45 mins spent with patient, chart review, reviewing labs and previous notes as well as documentation of the encounter today.       Leisa Armenta M.D.  Obstetrics and Gynecology

## 2022-10-03 ENCOUNTER — OFFICE VISIT (OUTPATIENT)
Dept: OBSTETRICS AND GYNECOLOGY | Facility: CLINIC | Age: 42
End: 2022-10-03
Payer: MEDICAID

## 2022-10-03 ENCOUNTER — HOSPITAL ENCOUNTER (OUTPATIENT)
Dept: RADIOLOGY | Facility: HOSPITAL | Age: 42
Discharge: HOME | End: 2022-10-03
Attending: STUDENT IN AN ORGANIZED HEALTH CARE EDUCATION/TRAINING PROGRAM
Payer: MEDICAID

## 2022-10-03 VITALS — DIASTOLIC BLOOD PRESSURE: 74 MMHG | BODY MASS INDEX: 23.48 KG/M2 | SYSTOLIC BLOOD PRESSURE: 110 MMHG | WEIGHT: 145.5 LBS

## 2022-10-03 DIAGNOSIS — O02.1 MISSED AB: ICD-10-CM

## 2022-10-03 DIAGNOSIS — O03.4 RETAINED PRODUCTS OF CONCEPTION AFTER MISCARRIAGE: Primary | ICD-10-CM

## 2022-10-03 PROCEDURE — 1159F MED LIST DOCD IN RCRD: CPT | Mod: CPTII,,, | Performed by: STUDENT IN AN ORGANIZED HEALTH CARE EDUCATION/TRAINING PROGRAM

## 2022-10-03 PROCEDURE — 99214 PR OFFICE/OUTPT VISIT, EST, LEVL IV, 30-39 MIN: ICD-10-PCS | Mod: S$PBB,TH,, | Performed by: STUDENT IN AN ORGANIZED HEALTH CARE EDUCATION/TRAINING PROGRAM

## 2022-10-03 PROCEDURE — 76801 OB US < 14 WKS SINGLE FETUS: CPT | Mod: 26,,, | Performed by: RADIOLOGY

## 2022-10-03 PROCEDURE — 3078F PR MOST RECENT DIASTOLIC BLOOD PRESSURE < 80 MM HG: ICD-10-PCS | Mod: CPTII,,, | Performed by: STUDENT IN AN ORGANIZED HEALTH CARE EDUCATION/TRAINING PROGRAM

## 2022-10-03 PROCEDURE — 99213 OFFICE O/P EST LOW 20 MIN: CPT | Mod: PBBFAC,25,TH,PN | Performed by: STUDENT IN AN ORGANIZED HEALTH CARE EDUCATION/TRAINING PROGRAM

## 2022-10-03 PROCEDURE — 99999 PR PBB SHADOW E&M-EST. PATIENT-LVL III: CPT | Mod: PBBFAC,,, | Performed by: STUDENT IN AN ORGANIZED HEALTH CARE EDUCATION/TRAINING PROGRAM

## 2022-10-03 PROCEDURE — 99999 PR PBB SHADOW E&M-EST. PATIENT-LVL III: ICD-10-PCS | Mod: PBBFAC,,, | Performed by: STUDENT IN AN ORGANIZED HEALTH CARE EDUCATION/TRAINING PROGRAM

## 2022-10-03 PROCEDURE — 99214 OFFICE O/P EST MOD 30 MIN: CPT | Mod: S$PBB,TH,, | Performed by: STUDENT IN AN ORGANIZED HEALTH CARE EDUCATION/TRAINING PROGRAM

## 2022-10-03 PROCEDURE — 76830 TRANSVAGINAL US NON-OB: CPT | Mod: TC,PN

## 2022-10-03 PROCEDURE — 1159F PR MEDICATION LIST DOCUMENTED IN MEDICAL RECORD: ICD-10-PCS | Mod: CPTII,,, | Performed by: STUDENT IN AN ORGANIZED HEALTH CARE EDUCATION/TRAINING PROGRAM

## 2022-10-03 PROCEDURE — 3078F DIAST BP <80 MM HG: CPT | Mod: CPTII,,, | Performed by: STUDENT IN AN ORGANIZED HEALTH CARE EDUCATION/TRAINING PROGRAM

## 2022-10-03 PROCEDURE — 3074F PR MOST RECENT SYSTOLIC BLOOD PRESSURE < 130 MM HG: ICD-10-PCS | Mod: CPTII,,, | Performed by: STUDENT IN AN ORGANIZED HEALTH CARE EDUCATION/TRAINING PROGRAM

## 2022-10-03 PROCEDURE — 3074F SYST BP LT 130 MM HG: CPT | Mod: CPTII,,, | Performed by: STUDENT IN AN ORGANIZED HEALTH CARE EDUCATION/TRAINING PROGRAM

## 2022-10-03 PROCEDURE — 76817 PR US, OB, TRANSVAG APPROACH: ICD-10-PCS | Mod: 26,,, | Performed by: RADIOLOGY

## 2022-10-03 PROCEDURE — 3008F BODY MASS INDEX DOCD: CPT | Mod: CPTII,,, | Performed by: STUDENT IN AN ORGANIZED HEALTH CARE EDUCATION/TRAINING PROGRAM

## 2022-10-03 PROCEDURE — 3008F PR BODY MASS INDEX (BMI) DOCUMENTED: ICD-10-PCS | Mod: CPTII,,, | Performed by: STUDENT IN AN ORGANIZED HEALTH CARE EDUCATION/TRAINING PROGRAM

## 2022-10-03 PROCEDURE — 76801 PR US, OB <14WKS, TRANSABD, SINGLE GESTATION: ICD-10-PCS | Mod: 26,,, | Performed by: RADIOLOGY

## 2022-10-03 PROCEDURE — 76817 TRANSVAGINAL US OBSTETRIC: CPT | Mod: 26,,, | Performed by: RADIOLOGY

## 2022-10-03 RX ORDER — DOXYCYCLINE HYCLATE 100 MG
200 TABLET ORAL
Status: CANCELLED | OUTPATIENT
Start: 2022-10-03

## 2022-10-03 RX ORDER — SODIUM CHLORIDE 9 MG/ML
INJECTION, SOLUTION INTRAVENOUS CONTINUOUS
Status: CANCELLED | OUTPATIENT
Start: 2022-10-03

## 2022-10-03 NOTE — PROGRESS NOTES
History & Physical  Gynecology      SUBJECTIVE:     Chief Complaint: Follow-up (Ultrasound)       History of Present Illness:    Here today for f/u of US after SAB at home. No more blood clots but still having some brown/pink bleeding. No fever.    TVUS:  COMPARISON:  2022     FINDINGS:  There is nonvascular heterogeneous material within the endometrial cavity consistent with retained products of conception in this patient who is had a recent spontaneous .  There is endometrial thickening which measures 16 mm.  The uterus is normal in size and measures 9.7 x 3.5 x 4.3 cm.  Both ovaries are normal in size and appearance.  The right ovary measures 2.1 x 3 x 2.4 cm and the left ovary measures 2.2 x 2.4 x 2.2 cm.  A small amount of free fluid is present in the pelvic cul-de-sac.     Impression:     Findings consistent with retained products of conception within the endometrial cavity in this patient who has had a recent spontaneous .    Review of patient's allergies indicates:   Allergen Reactions    Allergenic extracts        History reviewed. No pertinent past medical history.  Past Surgical History:   Procedure Laterality Date     SECTION       OB History          3    Para   2    Term   2            AB        Living   2         SAB        IAB        Ectopic        Multiple        Live Births   2               Family History   Problem Relation Age of Onset    Breast cancer Neg Hx     Ovarian cancer Neg Hx      Social History     Tobacco Use    Smoking status: Never    Smokeless tobacco: Never   Substance Use Topics    Alcohol use: Not Currently    Drug use: Never       Current Outpatient Medications   Medication Sig    norethindrone (ORTHO MICRONOR) 0.35 mg tablet Take 1 tablet (0.35 mg total) by mouth once daily.    PNV,calcium 72-iron-folic acid (PRENATAL VITAMIN PLUS LOW IRON) 27 mg iron- 1 mg Tab      No current facility-administered medications for this visit.          Review of Systems:  Review of Systems   Constitutional:  Negative for chills, fatigue and fever.   HENT:  Negative for congestion.    Eyes:  Negative for visual disturbance.   Respiratory:  Negative for cough and shortness of breath.    Cardiovascular:  Negative for chest pain and palpitations.   Gastrointestinal:  Negative for abdominal distention, abdominal pain, constipation, diarrhea, nausea and vomiting.   Genitourinary:  Negative for difficulty urinating, dysuria, hematuria, vaginal bleeding and vaginal discharge.   Skin:  Negative for rash.   Neurological:  Negative for dizziness, seizures, light-headedness and headaches.   Hematological:  Does not bruise/bleed easily.   Psychiatric/Behavioral:  Negative for dysphoric mood. The patient is not nervous/anxious.       OBJECTIVE:     Physical Exam:  Physical Exam  Vitals reviewed.   Constitutional:       General: She is not in acute distress.     Appearance: Normal appearance. She is well-developed.   HENT:      Head: Normocephalic and atraumatic.   Cardiovascular:      Rate and Rhythm: Normal rate and regular rhythm.   Pulmonary:      Effort: Pulmonary effort is normal.   Abdominal:      General: There is no distension.      Palpations: Abdomen is soft.   Genitourinary:     Vagina: Normal.   Skin:     General: Skin is warm.   Neurological:      Mental Status: She is alert and oriented to person, place, and time.   Psychiatric:         Behavior: Behavior normal.         Thought Content: Thought content normal.         Judgment: Judgment normal.         ASSESSMENT:       ICD-10-CM ICD-9-CM    1. Retained products of conception after miscarriage  O03.4 634.90           No orders of the defined types were placed in this encounter.          Plan:      - Discussed options of retained products. We Discussed risk of infection is left untreated. We discussed the management options including expectant, medical, and surgical.     With expectant management 80% of  women will naturally expulse products within 8 weeks. Bleeding precautions and ED precautions were discussed. After a bleeding episode we recommend a UPT or ultrasound within 2 weeks to confirm complete expulsion. Discussed risks of retained products and possible need for D&C.     We discussed medical management of the miscarriage/retained POC, the success rate of this is roughly 85%.  You'll place 800 micrograms (4 tablets) vaginally. If you get no response within 3 hours (bleeding/cramping), you can place an additional 800 micrograms in the vagina. I will call in some pain medication for you to take.  If you're experiencing heavy vaginal bleeding for greater than 2 hours (2 pads/hr for 2 hours), feel light headed, dizzy or weak you may need to go to the ED for evaluation. Discussed possible need for D&C. If you've have not passed anything within 7 days, let me know and we need to do a D&C. We'll make an apt in 3 weeks to do a pregnancy test in the office and then discuss next steps.     Surgical management discussed with a success rate of 99%. Discussed D&C procedure in detail.  Discussed need for general anesthesia. Discussed risk of infection and antibiotic therapy prior to procedure.     - patient opts for D&C  - will schedule  - s/p Rhogam in ED   Leisa Armenta M.D.  Obstetrics and Gynecology

## 2022-10-04 ENCOUNTER — TELEPHONE (OUTPATIENT)
Dept: OBSTETRICS AND GYNECOLOGY | Facility: CLINIC | Age: 42
End: 2022-10-04
Payer: MEDICAID

## 2022-10-04 DIAGNOSIS — O03.4 RETAINED PRODUCTS OF CONCEPTION AFTER MISCARRIAGE: Primary | ICD-10-CM

## 2022-10-04 NOTE — TELEPHONE ENCOUNTER
----- Message from Leisa Armenta MD sent at 10/3/2022  2:29 PM CDT -----  Can we book this patient for a suction D&C for retained products following a spontaneous . Dx can be retained products of conception. Needs to be this week. Any place, preferably AM start or noon case. No preop, I'll sign consents day of.     Thank you

## 2022-10-04 NOTE — TELEPHONE ENCOUNTER
Please contact pt, spoke to her she would like to do the managed wait time and see what happens, she went home and researched the 3 options you had advised to her. Her D&C is scheduled for 10/6/22 at noon. She waters not want to have it done at this time unless you find it absolutely necessary. Please call/advise.

## 2022-10-04 NOTE — TELEPHONE ENCOUNTER
Called patient and spoke with her. Undecided about D&C vs expectant management. Discussed options again. 10 minutes spent again discussing all options r/b/a. Leaning toward expectant management. If chooses would repeat TVUS in 1 month and beta-hcg. No intercourse in meantime. All questions answered. Bleeding and infection precautions given.    Leisa Armenta M.D.  Obstetrics and Gynecology

## 2022-10-05 ENCOUNTER — TELEPHONE (OUTPATIENT)
Dept: OBSTETRICS AND GYNECOLOGY | Facility: CLINIC | Age: 42
End: 2022-10-05
Payer: MEDICAID

## 2022-10-05 DIAGNOSIS — O02.1 MISSED ABORTION: Primary | ICD-10-CM

## 2022-10-05 NOTE — TELEPHONE ENCOUNTER
----- Message from Samira Stewart sent at 10/5/2022  9:41 AM CDT -----  Contact: called at [phone]   Type: Needs Medical Advice  Who Called: Pt  Best Call Back Number: [phone]   Additional Information: Pt has an appointment gurwinder for 10/06/2022 for 12 noon, pt states she needs to cancel this appointment. Please call back and advice.

## 2022-11-03 ENCOUNTER — HOSPITAL ENCOUNTER (OUTPATIENT)
Dept: RADIOLOGY | Facility: HOSPITAL | Age: 42
Discharge: HOME | End: 2022-11-03
Attending: STUDENT IN AN ORGANIZED HEALTH CARE EDUCATION/TRAINING PROGRAM
Payer: MEDICAID

## 2022-11-03 ENCOUNTER — OFFICE VISIT (OUTPATIENT)
Dept: OBSTETRICS AND GYNECOLOGY | Facility: CLINIC | Age: 42
End: 2022-11-03
Payer: MEDICAID

## 2022-11-03 VITALS
RESPIRATION RATE: 16 BRPM | HEIGHT: 66 IN | SYSTOLIC BLOOD PRESSURE: 118 MMHG | DIASTOLIC BLOOD PRESSURE: 66 MMHG | BODY MASS INDEX: 23.38 KG/M2 | WEIGHT: 145.5 LBS

## 2022-11-03 DIAGNOSIS — O03.9 SAB (SPONTANEOUS ABORTION): Primary | ICD-10-CM

## 2022-11-03 DIAGNOSIS — O02.1 MISSED ABORTION: ICD-10-CM

## 2022-11-03 PROCEDURE — 76856 US EXAM PELVIC COMPLETE: CPT | Mod: 26,,, | Performed by: RADIOLOGY

## 2022-11-03 PROCEDURE — 76856 US PELVIS COMP WITH TRANSVAG NON-OB (XPD): ICD-10-PCS | Mod: 26,,, | Performed by: RADIOLOGY

## 2022-11-03 PROCEDURE — 76830 TRANSVAGINAL US NON-OB: CPT | Mod: TC,PN

## 2022-11-03 PROCEDURE — 99999 PR PBB SHADOW E&M-EST. PATIENT-LVL III: CPT | Mod: PBBFAC,,, | Performed by: STUDENT IN AN ORGANIZED HEALTH CARE EDUCATION/TRAINING PROGRAM

## 2022-11-03 PROCEDURE — 99213 PR OFFICE/OUTPT VISIT, EST, LEVL III, 20-29 MIN: ICD-10-PCS | Mod: S$PBB,TH,, | Performed by: STUDENT IN AN ORGANIZED HEALTH CARE EDUCATION/TRAINING PROGRAM

## 2022-11-03 PROCEDURE — 3074F PR MOST RECENT SYSTOLIC BLOOD PRESSURE < 130 MM HG: ICD-10-PCS | Mod: CPTII,,, | Performed by: STUDENT IN AN ORGANIZED HEALTH CARE EDUCATION/TRAINING PROGRAM

## 2022-11-03 PROCEDURE — 3008F PR BODY MASS INDEX (BMI) DOCUMENTED: ICD-10-PCS | Mod: CPTII,,, | Performed by: STUDENT IN AN ORGANIZED HEALTH CARE EDUCATION/TRAINING PROGRAM

## 2022-11-03 PROCEDURE — 76830 TRANSVAGINAL US NON-OB: CPT | Mod: 26,,, | Performed by: RADIOLOGY

## 2022-11-03 PROCEDURE — 1159F PR MEDICATION LIST DOCUMENTED IN MEDICAL RECORD: ICD-10-PCS | Mod: CPTII,,, | Performed by: STUDENT IN AN ORGANIZED HEALTH CARE EDUCATION/TRAINING PROGRAM

## 2022-11-03 PROCEDURE — 76830 US PELVIS COMP WITH TRANSVAG NON-OB (XPD): ICD-10-PCS | Mod: 26,,, | Performed by: RADIOLOGY

## 2022-11-03 PROCEDURE — 99999 PR PBB SHADOW E&M-EST. PATIENT-LVL III: ICD-10-PCS | Mod: PBBFAC,,, | Performed by: STUDENT IN AN ORGANIZED HEALTH CARE EDUCATION/TRAINING PROGRAM

## 2022-11-03 PROCEDURE — 3074F SYST BP LT 130 MM HG: CPT | Mod: CPTII,,, | Performed by: STUDENT IN AN ORGANIZED HEALTH CARE EDUCATION/TRAINING PROGRAM

## 2022-11-03 PROCEDURE — 3008F BODY MASS INDEX DOCD: CPT | Mod: CPTII,,, | Performed by: STUDENT IN AN ORGANIZED HEALTH CARE EDUCATION/TRAINING PROGRAM

## 2022-11-03 PROCEDURE — 99213 OFFICE O/P EST LOW 20 MIN: CPT | Mod: PBBFAC,25,TH,PN | Performed by: STUDENT IN AN ORGANIZED HEALTH CARE EDUCATION/TRAINING PROGRAM

## 2022-11-03 PROCEDURE — 1159F MED LIST DOCD IN RCRD: CPT | Mod: CPTII,,, | Performed by: STUDENT IN AN ORGANIZED HEALTH CARE EDUCATION/TRAINING PROGRAM

## 2022-11-03 PROCEDURE — 99213 OFFICE O/P EST LOW 20 MIN: CPT | Mod: S$PBB,TH,, | Performed by: STUDENT IN AN ORGANIZED HEALTH CARE EDUCATION/TRAINING PROGRAM

## 2022-11-03 PROCEDURE — 3078F PR MOST RECENT DIASTOLIC BLOOD PRESSURE < 80 MM HG: ICD-10-PCS | Mod: CPTII,,, | Performed by: STUDENT IN AN ORGANIZED HEALTH CARE EDUCATION/TRAINING PROGRAM

## 2022-11-03 PROCEDURE — 3078F DIAST BP <80 MM HG: CPT | Mod: CPTII,,, | Performed by: STUDENT IN AN ORGANIZED HEALTH CARE EDUCATION/TRAINING PROGRAM

## 2022-11-03 NOTE — PROGRESS NOTES
History & Physical  Gynecology      SUBJECTIVE:     Chief Complaint: f/u missed ab//TVUS/BetaHcg       History of Present Illness:    Here today after expectant management of SAB. Doing well. Had heavier period 10/14/22. No further bleeding. Occasional RLQ pain.       Review of patient's allergies indicates:   Allergen Reactions    Allergenic extracts        Past Medical History:   Diagnosis Date    Asthma     as child     Past Surgical History:   Procedure Laterality Date     SECTION       OB History          4    Para   2    Term   2            AB        Living   2         SAB        IAB        Ectopic        Multiple        Live Births   2               Family History   Problem Relation Age of Onset    Asthma Brother     Breast cancer Neg Hx     Ovarian cancer Neg Hx      Social History     Tobacco Use    Smoking status: Never    Smokeless tobacco: Never   Substance Use Topics    Alcohol use: Not Currently    Drug use: Never       Current Outpatient Medications   Medication Sig    PNV,calcium 72-iron-folic acid (PRENATAL VITAMIN PLUS LOW IRON) 27 mg iron- 1 mg Tab      No current facility-administered medications for this visit.         Review of Systems:  Review of Systems   Constitutional:  Negative for chills, fatigue and fever.   HENT:  Negative for congestion.    Eyes:  Negative for visual disturbance.   Respiratory:  Negative for cough and shortness of breath.    Cardiovascular:  Negative for chest pain and palpitations.   Gastrointestinal:  Negative for abdominal distention, abdominal pain, constipation, diarrhea, nausea and vomiting.   Genitourinary:  Negative for difficulty urinating, dysuria, hematuria, vaginal bleeding and vaginal discharge.   Skin:  Negative for rash.   Neurological:  Negative for dizziness, seizures, light-headedness and headaches.   Hematological:  Does not bruise/bleed easily.   Psychiatric/Behavioral:  Negative for dysphoric mood. The patient is not  nervous/anxious.       OBJECTIVE:     Physical Exam:  Physical Exam  Vitals reviewed.   Constitutional:       General: She is not in acute distress.     Appearance: Normal appearance. She is well-developed.   HENT:      Head: Normocephalic and atraumatic.   Cardiovascular:      Rate and Rhythm: Normal rate and regular rhythm.   Pulmonary:      Effort: Pulmonary effort is normal.   Abdominal:      General: There is no distension.      Palpations: Abdomen is soft.   Genitourinary:     Vagina: Normal.   Skin:     General: Skin is warm.   Neurological:      Mental Status: She is alert and oriented to person, place, and time.   Psychiatric:         Behavior: Behavior normal.         Thought Content: Thought content normal.         Judgment: Judgment normal.         ASSESSMENT:       ICD-10-CM ICD-9-CM    1. SAB (spontaneous )  O03.9 634.90           No orders of the defined types were placed in this encounter.          Plan:      -US images reviewed, formal read not back  - EMS thin, completed AB  - 6cm simple appearing ovarian cyst noted, discussed findings, will repeat TVUS in 6 months.     Leisa Armenta M.D.  Obstetrics and Gynecology

## 2023-10-03 DIAGNOSIS — N91.0 DELAYED MENSES: Primary | ICD-10-CM

## 2023-10-04 ENCOUNTER — LAB VISIT (OUTPATIENT)
Dept: LAB | Facility: HOSPITAL | Age: 43
End: 2023-10-04
Attending: STUDENT IN AN ORGANIZED HEALTH CARE EDUCATION/TRAINING PROGRAM
Payer: MEDICAID

## 2023-10-04 DIAGNOSIS — N91.0 DELAYED MENSES: ICD-10-CM

## 2023-10-04 LAB — HCG INTACT+B SERPL-ACNC: 723 MIU/ML

## 2023-10-04 PROCEDURE — 36415 COLL VENOUS BLD VENIPUNCTURE: CPT | Mod: PN | Performed by: STUDENT IN AN ORGANIZED HEALTH CARE EDUCATION/TRAINING PROGRAM

## 2023-10-04 PROCEDURE — 84702 CHORIONIC GONADOTROPIN TEST: CPT | Performed by: STUDENT IN AN ORGANIZED HEALTH CARE EDUCATION/TRAINING PROGRAM

## 2023-10-06 ENCOUNTER — LAB VISIT (OUTPATIENT)
Dept: LAB | Facility: HOSPITAL | Age: 43
End: 2023-10-06
Attending: STUDENT IN AN ORGANIZED HEALTH CARE EDUCATION/TRAINING PROGRAM
Payer: MEDICAID

## 2023-10-06 DIAGNOSIS — N91.0 DELAYED MENSES: ICD-10-CM

## 2023-10-06 LAB — HCG INTACT+B SERPL-ACNC: 1283 MIU/ML

## 2023-10-06 PROCEDURE — 36415 COLL VENOUS BLD VENIPUNCTURE: CPT | Mod: PN | Performed by: STUDENT IN AN ORGANIZED HEALTH CARE EDUCATION/TRAINING PROGRAM

## 2023-10-06 PROCEDURE — 84702 CHORIONIC GONADOTROPIN TEST: CPT | Performed by: STUDENT IN AN ORGANIZED HEALTH CARE EDUCATION/TRAINING PROGRAM

## 2023-10-31 ENCOUNTER — ROUTINE PRENATAL (OUTPATIENT)
Dept: OBSTETRICS AND GYNECOLOGY | Facility: CLINIC | Age: 43
End: 2023-10-31
Payer: MEDICAID

## 2023-10-31 VITALS
SYSTOLIC BLOOD PRESSURE: 122 MMHG | BODY MASS INDEX: 22.84 KG/M2 | DIASTOLIC BLOOD PRESSURE: 78 MMHG | WEIGHT: 141.56 LBS

## 2023-10-31 DIAGNOSIS — N96 RECURRENT PREGNANCY LOSS: ICD-10-CM

## 2023-10-31 DIAGNOSIS — N91.2 ABSENT MENSES: Primary | ICD-10-CM

## 2023-10-31 LAB
B-HCG UR QL: NEGATIVE
CTP QC/QA: YES

## 2023-10-31 PROCEDURE — 99214 OFFICE O/P EST MOD 30 MIN: CPT | Mod: S$PBB,,, | Performed by: STUDENT IN AN ORGANIZED HEALTH CARE EDUCATION/TRAINING PROGRAM

## 2023-10-31 PROCEDURE — 99999PBSHW POCT URINE PREGNANCY: Mod: PBBFAC,,,

## 2023-10-31 PROCEDURE — 99999 PR PBB SHADOW E&M-EST. PATIENT-LVL III: ICD-10-PCS | Mod: PBBFAC,,, | Performed by: STUDENT IN AN ORGANIZED HEALTH CARE EDUCATION/TRAINING PROGRAM

## 2023-10-31 PROCEDURE — 81025 URINE PREGNANCY TEST: CPT | Mod: PBBFAC,PN | Performed by: STUDENT IN AN ORGANIZED HEALTH CARE EDUCATION/TRAINING PROGRAM

## 2023-10-31 PROCEDURE — 99214 PR OFFICE/OUTPT VISIT, EST, LEVL IV, 30-39 MIN: ICD-10-PCS | Mod: S$PBB,,, | Performed by: STUDENT IN AN ORGANIZED HEALTH CARE EDUCATION/TRAINING PROGRAM

## 2023-10-31 PROCEDURE — 99213 OFFICE O/P EST LOW 20 MIN: CPT | Mod: PBBFAC,PN | Performed by: STUDENT IN AN ORGANIZED HEALTH CARE EDUCATION/TRAINING PROGRAM

## 2023-10-31 PROCEDURE — 99999PBSHW POCT URINE PREGNANCY: ICD-10-PCS | Mod: PBBFAC,,,

## 2023-10-31 PROCEDURE — 99999 PR PBB SHADOW E&M-EST. PATIENT-LVL III: CPT | Mod: PBBFAC,,, | Performed by: STUDENT IN AN ORGANIZED HEALTH CARE EDUCATION/TRAINING PROGRAM

## 2023-10-31 RX ORDER — AMOXICILLIN 500 MG
CAPSULE ORAL DAILY
COMMUNITY

## 2023-10-31 NOTE — PROGRESS NOTES
Here today for initial OB apt. LMP 23. GA 8w6d. Had heavier vaginal bleeding over the weekend. Passed clots and tissue. Now cramping improved and vaginal bleeding similar to period.      C/s  G2   G3   G4 SAB  G5 current     Rafaela was seen today for possible pregnancy.    Diagnoses and all orders for this visit:    Absent menses  -     POCT urine pregnancy    Recurrent pregnancy loss  -     TSH; Future  -     CBC Auto Differential; Future  -     Hemoglobin A1C; Future  -     LUPUS ANTICOAGULANT (DRVVT); Future  -     Beta-2 Glycoprotein Abs (IgA, IgG, IgM); Future  -     ANTIPHOSPHOLIPID AB (ANTICARDIOLIPIN); Future  -     US Pelvis Comp with Transvag NON-OB (xpd; Future          A/P:  - upt negative today, discussed another SAB. Discussed chromosome and miscarriage rate of nearly 80% at age 43  - RPL labs ordered, TVUS ordered f/u 6cm right ovarian cyst and to r/o uterine etiology of RPL  - offered [facility] referral for possible IVF with PGT for chromosomes   - Reassurance given, discussed grief     Leisa Armenta M.D.  Obstetrics and Gynecology

## 2023-10-31 NOTE — Clinical Note
Can we schedule her bloodwork and TVUS through [facility]? She wanted to just go home after being diagnosed with 2nd miscarriage and asked if we could schedule through [facility]. Also can we fax [facility] Referral placed today to [phone]

## 2023-11-01 ENCOUNTER — TELEPHONE (OUTPATIENT)
Dept: OBSTETRICS AND GYNECOLOGY | Facility: CLINIC | Age: 43
End: 2023-11-01
Payer: MEDICAID

## 2023-11-01 NOTE — TELEPHONE ENCOUNTER
----- Message from Yanet Perez MA sent at 11/1/2023  9:29 AM CDT -----  Regarding: Patient Returning Call  Type:  Patient Returning Call    Who Called:  pt  Who Left Message for Patient:  nurse  Does the patient know what this is regarding?:  yes  Best Call Back Number:  [phone]  Additional Information:  please advise

## 2023-11-14 ENCOUNTER — HOSPITAL ENCOUNTER (OUTPATIENT)
Dept: RADIOLOGY | Facility: HOSPITAL | Age: 43
Discharge: HOME | End: 2023-11-14
Attending: STUDENT IN AN ORGANIZED HEALTH CARE EDUCATION/TRAINING PROGRAM
Payer: MEDICAID

## 2023-11-14 DIAGNOSIS — N96 RECURRENT PREGNANCY LOSS: ICD-10-CM

## 2023-11-14 PROCEDURE — 76830 TRANSVAGINAL US NON-OB: CPT | Mod: 26,,, | Performed by: RADIOLOGY

## 2023-11-14 PROCEDURE — 76830 US PELVIS COMP WITH TRANSVAG NON-OB (XPD): ICD-10-PCS | Mod: 26,,, | Performed by: RADIOLOGY

## 2023-11-14 PROCEDURE — 76856 US PELVIS COMP WITH TRANSVAG NON-OB (XPD): ICD-10-PCS | Mod: 26,,, | Performed by: RADIOLOGY

## 2023-11-14 PROCEDURE — 76856 US EXAM PELVIC COMPLETE: CPT | Mod: 26,,, | Performed by: RADIOLOGY

## 2023-11-14 PROCEDURE — 76856 US EXAM PELVIC COMPLETE: CPT | Mod: TC,PN

## 2023-11-20 ENCOUNTER — PATIENT MESSAGE (OUTPATIENT)
Dept: OBSTETRICS AND GYNECOLOGY | Facility: CLINIC | Age: 43
End: 2023-11-20
Payer: MEDICAID

## 2024-02-06 ENCOUNTER — LAB VISIT (OUTPATIENT)
Dept: LAB | Facility: HOSPITAL | Age: 44
End: 2024-02-06
Attending: STUDENT IN AN ORGANIZED HEALTH CARE EDUCATION/TRAINING PROGRAM
Payer: MEDICAID

## 2024-02-06 DIAGNOSIS — N96 RECURRENT PREGNANCY LOSS: ICD-10-CM

## 2024-02-06 PROCEDURE — 86146 BETA-2 GLYCOPROTEIN ANTIBODY: CPT | Mod: 59 | Performed by: STUDENT IN AN ORGANIZED HEALTH CARE EDUCATION/TRAINING PROGRAM

## 2024-02-06 PROCEDURE — 86147 CARDIOLIPIN ANTIBODY EA IG: CPT | Mod: 59 | Performed by: STUDENT IN AN ORGANIZED HEALTH CARE EDUCATION/TRAINING PROGRAM

## 2024-02-06 PROCEDURE — 85613 RUSSELL VIPER VENOM DILUTED: CPT | Performed by: STUDENT IN AN ORGANIZED HEALTH CARE EDUCATION/TRAINING PROGRAM

## 2024-02-08 LAB
APTT IMM NP PPP: NORMAL SEC (ref 32–48)
APTT P HEP NEUT PPP: NORMAL SEC (ref 32–48)
CONFIRM APTT STACLOT: NORMAL
DRVVT SCREEN TO CONFIRM RATIO: NORMAL RATIO
LA 3 SCREEN W REFLEX-IMP: NORMAL
LA APTT+DRVVT+PT W REFLEX PPP: NORMAL
MIXING DRVVT: NORMAL SEC (ref 33–44)
PROTHROMBIN TIME: 12.3 SEC (ref 12–15.5)
REPTILASE TIME: NORMAL SEC
SCREEN APTT: 45 SEC (ref 32–48)
SCREEN DRVVT: 34 SEC (ref 33–44)
THROMBIN TIME: NORMAL SEC (ref 14.7–19.5)

## 2024-02-09 LAB
B2 GLYCOPROT1 IGA SER QL: 1.5 U/ML
B2 GLYCOPROT1 IGG SER QL: 2.1 U/ML
B2 GLYCOPROT1 IGM SER QL: 8.2 U/ML
CARDIOLIPIN IGG SER IA-ACNC: <9.4 GPL (ref 0–14.99)
CARDIOLIPIN IGM SER IA-ACNC: <9.4 MPL (ref 0–12.49)

## 2024-02-15 ENCOUNTER — PATIENT MESSAGE (OUTPATIENT)
Dept: OBSTETRICS AND GYNECOLOGY | Facility: CLINIC | Age: 44
End: 2024-02-15
Payer: MEDICAID

## 2024-02-15 DIAGNOSIS — R79.89 ELEVATED BETA-2 MICROGLOBULIN: Primary | ICD-10-CM

## 2024-02-15 DIAGNOSIS — N96 RECURRENT PREGNANCY LOSS: ICD-10-CM

## 2024-02-15 NOTE — TELEPHONE ENCOUNTER
Dr. Armenta is out. Can you please review labs or should I schedule a visit with Anna to discuss? Please advise. Thank you!

## 2024-02-20 ENCOUNTER — PATIENT MESSAGE (OUTPATIENT)
Dept: OBSTETRICS AND GYNECOLOGY | Facility: CLINIC | Age: 44
End: 2024-02-20
Payer: MEDICAID

## 2024-02-27 ENCOUNTER — PATIENT MESSAGE (OUTPATIENT)
Dept: OBSTETRICS AND GYNECOLOGY | Facility: CLINIC | Age: 44
End: 2024-02-27
Payer: MEDICAID

## 2024-03-06 ENCOUNTER — TELEPHONE (OUTPATIENT)
Dept: HEMATOLOGY/ONCOLOGY | Facility: CLINIC | Age: 44
End: 2024-03-06
Payer: MEDICAID

## 2024-03-06 ENCOUNTER — OFFICE VISIT (OUTPATIENT)
Dept: OBSTETRICS AND GYNECOLOGY | Facility: CLINIC | Age: 44
End: 2024-03-06
Payer: MEDICAID

## 2024-03-06 ENCOUNTER — TELEPHONE (OUTPATIENT)
Dept: OBSTETRICS AND GYNECOLOGY | Facility: CLINIC | Age: 44
End: 2024-03-06

## 2024-03-06 VITALS
DIASTOLIC BLOOD PRESSURE: 76 MMHG | BODY MASS INDEX: 24.48 KG/M2 | SYSTOLIC BLOOD PRESSURE: 124 MMHG | WEIGHT: 151.69 LBS

## 2024-03-06 DIAGNOSIS — R79.89 ELEVATED BETA-2 MICROGLOBULIN: ICD-10-CM

## 2024-03-06 DIAGNOSIS — N96 RECURRENT PREGNANCY LOSS: Primary | ICD-10-CM

## 2024-03-06 PROCEDURE — 1159F MED LIST DOCD IN RCRD: CPT | Mod: CPTII,,, | Performed by: STUDENT IN AN ORGANIZED HEALTH CARE EDUCATION/TRAINING PROGRAM

## 2024-03-06 PROCEDURE — 3074F SYST BP LT 130 MM HG: CPT | Mod: CPTII,,, | Performed by: STUDENT IN AN ORGANIZED HEALTH CARE EDUCATION/TRAINING PROGRAM

## 2024-03-06 PROCEDURE — 99214 OFFICE O/P EST MOD 30 MIN: CPT | Mod: S$PBB,,, | Performed by: STUDENT IN AN ORGANIZED HEALTH CARE EDUCATION/TRAINING PROGRAM

## 2024-03-06 PROCEDURE — 3008F BODY MASS INDEX DOCD: CPT | Mod: CPTII,,, | Performed by: STUDENT IN AN ORGANIZED HEALTH CARE EDUCATION/TRAINING PROGRAM

## 2024-03-06 PROCEDURE — 99999 PR PBB SHADOW E&M-EST. PATIENT-LVL III: CPT | Mod: PBBFAC,,, | Performed by: STUDENT IN AN ORGANIZED HEALTH CARE EDUCATION/TRAINING PROGRAM

## 2024-03-06 PROCEDURE — 99213 OFFICE O/P EST LOW 20 MIN: CPT | Mod: PBBFAC,PN | Performed by: STUDENT IN AN ORGANIZED HEALTH CARE EDUCATION/TRAINING PROGRAM

## 2024-03-06 PROCEDURE — 3078F DIAST BP <80 MM HG: CPT | Mod: CPTII,,, | Performed by: STUDENT IN AN ORGANIZED HEALTH CARE EDUCATION/TRAINING PROGRAM

## 2024-03-06 RX ORDER — ASPIRIN 81 MG/1
81 TABLET ORAL DAILY
COMMUNITY

## 2024-03-06 RX ORDER — DIPHENHYDRAMINE HCL 25 MG
25 CAPSULE ORAL EVERY 6 HOURS PRN
COMMUNITY

## 2024-03-06 NOTE — Clinical Note
She had heme/onc referral placed by Anand, can we see if we can get her an apt or they aren't accepting her insurance who is?

## 2024-03-06 NOTE — PROGRESS NOTES
History & Physical  Gynecology      SUBJECTIVE:     Chief Complaint: Follow-up (Discuss lab work)       History of Present Illness:    Here today to discuss RPL and labs. Doing well. Saw [facility] while I was out on leave, was about to start treatment but decided against it. Was told AMH level was normal for her age.      C/s  G2   G3   G4 SAB  G5 SAB          Review of patient's allergies indicates:   Allergen Reactions    Allergenic extracts        Past Medical History:   Diagnosis Date    Asthma     as child     Past Surgical History:   Procedure Laterality Date     SECTION       OB History          5    Para   3    Term   3            AB   2    Living   2         SAB   2    IAB        Ectopic        Multiple        Live Births   2               Family History   Problem Relation Age of Onset    Asthma Brother     Breast cancer Neg Hx     Ovarian cancer Neg Hx     Colon cancer Neg Hx     Uterine cancer Neg Hx     Cervical cancer Neg Hx      Social History     Tobacco Use    Smoking status: Never    Smokeless tobacco: Never   Substance Use Topics    Alcohol use: Not Currently    Drug use: Never       Current Outpatient Medications   Medication Sig    aspirin (ECOTRIN) 81 MG EC tablet Take 81 mg by mouth once daily.    diphenhydrAMINE (BENADRYL) 25 mg capsule Take 25 mg by mouth every 6 (six) hours as needed for Itching.    omega-3 fatty acids/fish oil (FISH OIL-OMEGA-3 FATTY ACIDS) 300-1,000 mg capsule Take by mouth once daily.    PNV,calcium 72-iron-folic acid (PRENATAL VITAMIN PLUS LOW IRON) 27 mg iron- 1 mg Tab     UNABLE TO FIND 750 mg. medication name: Ml    UNABLE TO FIND medication name: Fo - Ti    UNABLE TO FIND medication name: Nettle (stinging)     No current facility-administered medications for this visit.         Review of Systems:  Review of Systems   Constitutional:  Negative for chills, fatigue and fever.   HENT:  Negative for congestion.    Eyes:  Negative for  visual disturbance.   Respiratory:  Negative for cough and shortness of breath.    Cardiovascular:  Negative for chest pain and palpitations.   Gastrointestinal:  Negative for abdominal distention, abdominal pain, constipation, diarrhea, nausea and vomiting.   Genitourinary:  Negative for difficulty urinating, dysuria, hematuria, vaginal bleeding and vaginal discharge.   Skin:  Negative for rash.   Neurological:  Negative for dizziness, seizures, light-headedness and headaches.   Hematological:  Does not bruise/bleed easily.   Psychiatric/Behavioral:  Negative for dysphoric mood. The patient is not nervous/anxious.         OBJECTIVE:     Physical Exam:  Physical Exam  Vitals reviewed.   Constitutional:       General: She is not in acute distress.     Appearance: Normal appearance. She is well-developed.   HENT:      Head: Normocephalic and atraumatic.   Cardiovascular:      Rate and Rhythm: Normal rate and regular rhythm.   Pulmonary:      Effort: Pulmonary effort is normal.   Abdominal:      General: There is no distension.      Palpations: Abdomen is soft.   Genitourinary:     Vagina: Normal.   Skin:     General: Skin is warm.   Neurological:      Mental Status: She is alert and oriented to person, place, and time.   Psychiatric:         Behavior: Behavior normal.         Thought Content: Thought content normal.         Judgment: Judgment normal.           ASSESSMENT:       ICD-10-CM ICD-9-CM    1. Recurrent pregnancy loss  N96 [id number] Ambulatory referral/consult to Perinatology      2. Elevated beta-2 microglobulin  R79.89 796.4 Ambulatory referral/consult to Perinatology          Orders Placed This Encounter   Procedures    Ambulatory referral/consult to Perinatology     Standing Status:   Future     Standing Expiration Date:   4/6/2025     Referral Priority:   Routine     Referral Type:   Consultation     Referral Reason:   Specialty Services Required     Requested Specialty:   Maternal and Fetal Medicine      Number of Visits Requested:   1           Plan:   - reviewed abnormal labs x 2, concern for APLS with losses and + labs. However, we discussed her age is likely contributory to losses for chromosomal abnormalities (passed at home, no POC testing)  - recommend to see MFM for preconception visit. Discussed possible lovenox with next pregnancy. Do not recommend progesterone supplementation unless otherwise instructed. Patient thinks she may have progesterone sensitivity (hives before period sometimes)  - previous heme/onc referral placed by Dr. Michel  - continue PNV    Leisa Armenta M.D.  Obstetrics and Gynecology

## 2024-03-06 NOTE — TELEPHONE ENCOUNTER
Spoke with Anitha in the call center, she was absolutely phenomenal in assisting me to help our pt get in to see Hem/onc since pt has been waiting for quite some time. She sent a pool message to that dept.         ----- Message from Leisa Armenta MD sent at 3/6/2024 11:21 AM CST -----  She had heme/onc referral placed by Anand, can we see if we can get her an apt or they aren't accepting her insurance who is?

## 2024-03-06 NOTE — TELEPHONE ENCOUNTER
----- Message from Anitha Xie sent at 3/6/2024  4:45 PM CST -----  Type:  Appointment Request    Caller is requesting an appointment.      Name of Caller:  Ene from [facility]    Symptoms:    N96 (ICD-10-CM) - Recurrent pregnancy loss  R79.89 (ICD-10-CM) - Elevated beta-2 microglobulin    Would the patient rather a call back or a response via [facility]?   Call pt    Best Call Back Number:   [phone]    Additional Information:  Pt has a referral from Dr. Michel and Dr. Armenta.  Please call back to advise. Thanks!

## 2024-03-07 NOTE — TELEPHONE ENCOUNTER
This heme referral did not fall into [facility].    Attempt to contact the pt to schedule from heme referral and LVM for pt to call back.     Pt returned call and discussed heme referral.  Scheduled pt with first available benign hem appt, April 15th.  Pt confirmed the appt date time and location.

## 2024-03-11 ENCOUNTER — OFFICE VISIT (OUTPATIENT)
Dept: MATERNAL FETAL MEDICINE | Facility: CLINIC | Age: 44
End: 2024-03-11
Payer: MEDICAID

## 2024-03-11 VITALS
SYSTOLIC BLOOD PRESSURE: 137 MMHG | BODY MASS INDEX: 24.55 KG/M2 | HEART RATE: 75 BPM | DIASTOLIC BLOOD PRESSURE: 70 MMHG | WEIGHT: 152.13 LBS

## 2024-03-11 DIAGNOSIS — Z31.69 ENCOUNTER FOR PRECONCEPTION CONSULTATION: Primary | ICD-10-CM

## 2024-03-11 DIAGNOSIS — R79.89 ELEVATED BETA-2 MICROGLOBULIN: ICD-10-CM

## 2024-03-11 DIAGNOSIS — N96 RECURRENT PREGNANCY LOSS: ICD-10-CM

## 2024-03-11 PROCEDURE — 99999 PR PBB SHADOW E&M-EST. PATIENT-LVL III: CPT | Mod: PBBFAC,,, | Performed by: STUDENT IN AN ORGANIZED HEALTH CARE EDUCATION/TRAINING PROGRAM

## 2024-03-11 PROCEDURE — 1159F MED LIST DOCD IN RCRD: CPT | Mod: CPTII,,, | Performed by: STUDENT IN AN ORGANIZED HEALTH CARE EDUCATION/TRAINING PROGRAM

## 2024-03-11 PROCEDURE — 3075F SYST BP GE 130 - 139MM HG: CPT | Mod: CPTII,,, | Performed by: STUDENT IN AN ORGANIZED HEALTH CARE EDUCATION/TRAINING PROGRAM

## 2024-03-11 PROCEDURE — 3008F BODY MASS INDEX DOCD: CPT | Mod: CPTII,,, | Performed by: STUDENT IN AN ORGANIZED HEALTH CARE EDUCATION/TRAINING PROGRAM

## 2024-03-11 PROCEDURE — 99205 OFFICE O/P NEW HI 60 MIN: CPT | Mod: S$PBB,,, | Performed by: STUDENT IN AN ORGANIZED HEALTH CARE EDUCATION/TRAINING PROGRAM

## 2024-03-11 PROCEDURE — 99213 OFFICE O/P EST LOW 20 MIN: CPT | Mod: PBBFAC,PN | Performed by: STUDENT IN AN ORGANIZED HEALTH CARE EDUCATION/TRAINING PROGRAM

## 2024-03-11 PROCEDURE — 3078F DIAST BP <80 MM HG: CPT | Mod: CPTII,,, | Performed by: STUDENT IN AN ORGANIZED HEALTH CARE EDUCATION/TRAINING PROGRAM

## 2024-03-11 NOTE — PROGRESS NOTES
MATERNAL-FETAL MEDICINE   CONSULT NOTE    Provider requesting consultation: Geovany  SUBJECTIVE:   Ms. Rafaela Mas is a 43 y.o.  female  presents for a preconception consultation by MFM for evaluation and management of:  Problem   Encounter for Preconception Consultation        Medication List with Changes/Refills   Current Medications    ASPIRIN (ECOTRIN) 81 MG EC TABLET    Take 81 mg by mouth once daily.    DIPHENHYDRAMINE (BENADRYL) 25 MG CAPSULE    Take 25 mg by mouth every 6 (six) hours as needed for Itching.    OMEGA-3 FATTY ACIDS/FISH OIL (FISH OIL-OMEGA-3 FATTY ACIDS) 300-1,000 MG CAPSULE    Take by mouth once daily.    PNV,CALCIUM 72-IRON-FOLIC ACID (PRENATAL VITAMIN PLUS LOW IRON) 27 MG IRON- 1 MG TAB        UNABLE TO FIND    750 mg. medication name: Kaur    UNABLE TO FIND    medication name: Fo - Ti    UNABLE TO FIND    medication name: Nettle (stinging)     Review of patient's allergies indicates:   Allergen Reactions    Allergenic extracts      OB History    Para Term  AB Living   5 3 3   2 3   SAB IAB Ectopic Multiple Live Births   2       3      # Outcome Date GA Lbr Cruz/2nd Weight Sex Delivery Anes PTL Lv   5 SAB 10/2023 8w0d          4 SAB 10/2022 10w0d          3 Term 2019   4.593 kg (10 lb 2 oz) M Vag-Spont   GAYATRI   2 Term 17 40w0d  3.884 kg (8 lb 9 oz) F Vag-Spont   GAYATRI   1 Term 14 40w0d  2.41 kg (5 lb 5 oz) F CS-Unspec   GAYATRI     Past Medical History:   Diagnosis Date    Asthma     as child     Past Surgical History:   Procedure Laterality Date     SECTION       Family history: negative for birth defects, recurrent miscarriages, chromosomal abnormalities.   Social History     Tobacco Use    Smoking status: Never    Smokeless tobacco: Never   Substance Use Topics    Alcohol use: Not Currently    Drug use: Never     Review of patient's allergies indicates:   Allergen Reactions    Allergenic extracts      Objective:   /70   Pulse 75   Wt 69 kg (152 lb  1.9 oz)   LMP 2024   BMI 24.55 kg/m²     ASSESSMENT/PLAN:     43 y.o.  female a preconception consultation by M for evaluation and management of:      Encounter for preconception consultation  Rafaela presents today for a preconception consultation. She and her  had 3 children at home( ages 4,6,9). These deliveries were the result of healthy full term pregnancies. She had a  section for her first and 2 successful VBACs. She desires a fourth child. After two miscarriages in  and , she underwent an evaluation. She reports a normal workup with Dr. Landers and the fertility institute(I do not have these records) as well as with Dr. Armenta.   During her workup, APLS labs were completed. She has no history of thromboembolism, preeclampsia, or other losses. She had two equivocal results for beta 2 glycoprotein IgM. For Rafaela to reach the diagnostic laboratory criteria of >99% for beta 2 glycoprotein IgM, that number would need to be 78 U/ml or greater. I reviewed the criteria for diagnosis for APLS with the patient. She does not meet criteria. Therefore I do not recommend anticoagulation in future pregnancies.    Very Advanced Maternal Age (>=45)  Women of very advanced maternal age (>=45 years) are at higher risk for several complications, including but not limited to: spontaneous , gestational diabetes mellitus,  delivery, placenta previa and accreta, hypertensive complications, and preeclampsia Their newborns were more frequently small for gestational age, and are more likely to have high rates of respiratory distress syndrome,  intensive care admission, growth restriction and / mortality. Pregnancies have >50%  delivery rate. There is also an increased risk of prolonged hospital stay, postpartum hemorrhage, postpartum fever and metabolic complications.    She has met with [facility] and is currently not interested in pursuing IVF. I also  discussed adoption and surrogacy as other avenues for raising a child such as surrogacy and adoption.    I discussed the age related risks of the common trisomies at this age as well as the increased risk of spontaneous abortions.    I also have included general preconception recommendations.    Preconceptional Recommendations:  Optimization of the patient's comorbid conditions  Avoidance of drug use/alcohol/tobacco  Elimination, if possible, of any unsafe medications  Daily prenatal vitamin.   Daily folic acid supplement (1mg).  Ensure vaccinations are up to date (defer conception for 1 month after MMR and varicella vaccine)  Consideration of genetic carrier screening (hemoglobinopathy, cystic fibrosis, and SMA screening) and discussion of expanded carrier screening as appropriate  Laboratory evaluation including: Rubella, Varicella, Hepatitis B surface antigen, CBC, HIV.  If the patient is Rh-negative, blood type determination of her partner.      Thank you for this consultation. Please reach out to us for any other questions or concerns. If pregnancy is established, I recommend referring her for a targeted anatomical survey.        65 minutes of total time spent on the encounter, which includes face to face time and non-face to face time preparing to see the patient (eg, review of tests), obtaining and/or reviewing separately obtained history, documenting clinical information in the electronic or other health record, independently interpreting results (not separately reported) and communicating results to the patient/family/caregiver, or care coordination (not separately reported).    Delmi King  Maternal-Fetal Medicine    Electronically Signed by Delmi King March 11, 2024

## 2024-03-11 NOTE — ASSESSMENT & PLAN NOTE
Rafaela presents today for a preconception consultation. She and her  had 3 children at home( ages 4,6,9). These deliveries were the result of healthy full term pregnancies. She had a  section for her first and 2 successful VBACs. She desires a fourth child. After two miscarriages in  and , she underwent an evaluation. She reports a normal workup with Dr. Landers and the fertility institute(I do not have these records) as well as with Dr. Armenta.   During her workup, APLS labs were completed. She has no history of thromboembolism, preeclampsia, or other losses. She had two equivocal results for beta 2 glycoprotein IgM. For Rafaela to reach the diagnostic laboratory criteria of >99% for beta 2 glycoprotein IgM, that number would need to be 78 U/ml or greater. I reviewed the criteria for diagnosis for APLS with the patient. She does not meet criteria. Therefore I do not recommend anticoagulation in future pregnancies.    Very Advanced Maternal Age (>=45)  Women of very advanced maternal age (>=45 years) are at higher risk for several complications, including but not limited to: spontaneous , gestational diabetes mellitus,  delivery, placenta previa and accreta, hypertensive complications, and preeclampsia Their newborns were more frequently small for gestational age, and are more likely to have high rates of respiratory distress syndrome,  intensive care admission, growth restriction and / mortality. Pregnancies have >50%  delivery rate. There is also an increased risk of prolonged hospital stay, postpartum hemorrhage, postpartum fever and metabolic complications.    She has met with [facility] and is currently not interested in pursuing IVF. I also discussed adoption and surrogacy as other avenues for raising a child such as surrogacy and adoption.    I discussed the age related risks of the common trisomies at this age as well as the increased risk of  spontaneous abortions.    I also have included general preconception recommendations.    Preconceptional Recommendations:  Optimization of the patient's comorbid conditions  Avoidance of drug use/alcohol/tobacco  Elimination, if possible, of any unsafe medications  Daily prenatal vitamin.   Daily folic acid supplement (1mg).  Ensure vaccinations are up to date (defer conception for 1 month after MMR and varicella vaccine)  Consideration of genetic carrier screening (hemoglobinopathy, cystic fibrosis, and SMA screening) and discussion of expanded carrier screening as appropriate  Laboratory evaluation including: Rubella, Varicella, Hepatitis B surface antigen, CBC, HIV.  If the patient is Rh-negative, blood type determination of her partner.

## 2024-04-15 ENCOUNTER — OFFICE VISIT (OUTPATIENT)
Dept: HEMATOLOGY/ONCOLOGY | Facility: CLINIC | Age: 44
End: 2024-04-15
Payer: MEDICAID

## 2024-04-15 VITALS
HEIGHT: 66 IN | TEMPERATURE: 98 F | WEIGHT: 154.56 LBS | SYSTOLIC BLOOD PRESSURE: 138 MMHG | BODY MASS INDEX: 24.84 KG/M2 | HEART RATE: 69 BPM | OXYGEN SATURATION: 98 % | RESPIRATION RATE: 18 BRPM | DIASTOLIC BLOOD PRESSURE: 80 MMHG

## 2024-04-15 DIAGNOSIS — R79.89 ELEVATED BETA-2 MICROGLOBULIN: ICD-10-CM

## 2024-04-15 DIAGNOSIS — R76.0 ANTIPHOSPHOLIPID ANTIBODY POSITIVE: ICD-10-CM

## 2024-04-15 DIAGNOSIS — N96 RECURRENT PREGNANCY LOSS: ICD-10-CM

## 2024-04-15 DIAGNOSIS — N96 HISTORY OF RECURRENT MISCARRIAGES: Primary | ICD-10-CM

## 2024-04-15 PROCEDURE — 3079F DIAST BP 80-89 MM HG: CPT | Mod: CPTII,,, | Performed by: INTERNAL MEDICINE

## 2024-04-15 PROCEDURE — 3008F BODY MASS INDEX DOCD: CPT | Mod: CPTII,,, | Performed by: INTERNAL MEDICINE

## 2024-04-15 PROCEDURE — 99205 OFFICE O/P NEW HI 60 MIN: CPT | Mod: S$PBB,,, | Performed by: INTERNAL MEDICINE

## 2024-04-15 PROCEDURE — 1159F MED LIST DOCD IN RCRD: CPT | Mod: CPTII,,, | Performed by: INTERNAL MEDICINE

## 2024-04-15 PROCEDURE — 99999 PR PBB SHADOW E&M-EST. PATIENT-LVL IV: CPT | Mod: PBBFAC,,, | Performed by: INTERNAL MEDICINE

## 2024-04-15 PROCEDURE — 99214 OFFICE O/P EST MOD 30 MIN: CPT | Mod: PBBFAC,PN | Performed by: INTERNAL MEDICINE

## 2024-04-15 PROCEDURE — 3075F SYST BP GE 130 - 139MM HG: CPT | Mod: CPTII,,, | Performed by: INTERNAL MEDICINE

## 2024-04-15 RX ORDER — DOXYCYCLINE HYCLATE 100 MG
TABLET ORAL
COMMUNITY
Start: 2023-11-27

## 2024-04-15 NOTE — PROGRESS NOTES
Subjective     Patient ID: Rafaela Mas is a 43 y.o. female.    Chief Complaint: No chief complaint on file.    HPI  Mrs. Ennis is a 43-year-old female referred for evaluation for recurrent pregnancy loss and an elevated beta 2 glycoprotein level.  Apparently she had 2 spontaneous abortions early on her pregnancies, before 12 weeks.    Specifically, on 2023 she tested negative for anticardiolipin antibodies and lupus anticoagulant, but the beta 2 glycoprotein IgM antibodies were slightly positive at 7.7 U/mL  The test was repeated in early February and was again positive at 8.2 units per mL  CBC on 2023 had shown a white count of 6900 per cubic mm, hemoglobin 13 grams/deciliter, hematocrit 39.7% and platelets 242 K  Apparently she is G 5 P3 with 2 spontaneous abortions, hence the referral to our service.      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  She does not smoke and does not drink alcohol.  FAMILY HISTORY:  Paternal grandmother with unknown type of cancer in maternal grandfather with prostate cancer  GYN HISTORY:  She is  with 2 spontaneous abortions at age 42 and 43.    Review of Systems     Overall she feels well and she has absolutely no complaints.  She states that she is actively trying to get pregnant.  Her ECOG PS is 0 and she denies any anxiety, depression, easy bruising, fevers, chills, night  sweats, weight loss, nausea, vomiting, diarrhea, constipation, diplopia,     blurred vision, headache, chest pain, palpitations, shortness of breath, breast pain, abdominal pain, extremity pain, or difficulty ambulating.  The remainder of the ten-point ROS, including general, skin, lymph, H/N, cardiorespiratory, GI, , Neuro, Endocrine, and psychiatric is negative.   Physical Exam       She is alert, oriented to time, place, person, pleasant, well      nourished, in  no acute physical distress.                                    VITAL SIGNS:  Reviewed                                      HEENT:  Normal.  There are no nasal, oral, lip, gingival, auricular, lid,    or conjunctival lesions.  Mucosae are moist and pink, and there is no        thrush.  Pupils are equal, reactive to light and accommodation.              Extraocular muscle movements are intact.  Dentition is good.  There is no frontal or maxillary tenderness.                                     NECK:  Supple without JVD, adenopathy, or thyromegaly.                       LUNGS:  Clear to auscultation without wheezing, rales, or rhonchi.           CARDIOVASCULAR:  Reveals an S1, S2, no murmurs, no rubs, no gallops.         ABDOMEN:  Soft, nontender, without organomegaly.  Bowel sounds are    present.                                                                     EXTREMITIES:  No cyanosis, clubbing, or edema.                               BREASTS:  Deferred                                     LYMPHATIC:  There is no cervical, axillary, or supraclavicular adenopathy.   SKIN:  Warm and moist, without petechiae, rashes, induration, or ecchymoses.           NEUROLOGIC:  DTRs are 0-1+ bilaterally, symmetrical, motor function is 5/5,  and cranial nerves are  within normal limits.    ASSESSMENT  Status post 2 spontaneous abortions at less than 12 weeks' gestation at ages 42 and 43 respectively  Presence of beta 2 glycoprotein antibodies      PLAN  I had a very long discussion with Mrs. Mas.  I explained to her that in the absence of thrombosis she does not need any anticoagulation.  Since she is actively trying to get pregnant, I advised her to be treated with prophylactic enoxaparin should she succeed in getting pregnant.  She realizes that i is possible that her 2 spontaneous abortions were secondary to chromosomal abnormalities due to her age rather than the presence of beta 2 glycoprotein antibodies.  I also advised  her to consider an enoxaparin injection should she ever take a long flight overseas.  I will be happy to see her again on a p.r.n. basis  Her multiple questions were answered to her satisfaction.  I spent approximately 33 minutes reviewing the available records, 20 minutes with the patient, and an additional 10 minutes communicating with the referring physicians and coordinating her care.

## 2025-07-30 ENCOUNTER — OFFICE VISIT (OUTPATIENT)
Dept: OBSTETRICS AND GYNECOLOGY | Facility: CLINIC | Age: 45
End: 2025-07-30
Payer: MEDICAID

## 2025-07-30 ENCOUNTER — LAB VISIT (OUTPATIENT)
Dept: LAB | Facility: HOSPITAL | Age: 45
End: 2025-07-30
Payer: MEDICAID

## 2025-07-30 VITALS
WEIGHT: 151.44 LBS | HEIGHT: 66 IN | DIASTOLIC BLOOD PRESSURE: 60 MMHG | BODY MASS INDEX: 24.34 KG/M2 | SYSTOLIC BLOOD PRESSURE: 110 MMHG

## 2025-07-30 DIAGNOSIS — Z12.4 CERVICAL CANCER SCREENING: ICD-10-CM

## 2025-07-30 DIAGNOSIS — Z01.419 WELL WOMAN EXAM WITH ROUTINE GYNECOLOGICAL EXAM: ICD-10-CM

## 2025-07-30 DIAGNOSIS — Z01.419 WELL WOMAN EXAM WITH ROUTINE GYNECOLOGICAL EXAM: Primary | ICD-10-CM

## 2025-07-30 DIAGNOSIS — Z12.31 BREAST CANCER SCREENING BY MAMMOGRAM: ICD-10-CM

## 2025-07-30 DIAGNOSIS — R00.2 HEART PALPITATIONS: ICD-10-CM

## 2025-07-30 LAB
ABSOLUTE EOSINOPHIL (OHS): 0.08 K/UL
ABSOLUTE MONOCYTE (OHS): 0.34 K/UL (ref 0.3–1)
ABSOLUTE NEUTROPHIL COUNT (OHS): 4.38 K/UL (ref 1.8–7.7)
ALBUMIN SERPL BCP-MCNC: 4.4 G/DL (ref 3.5–5.2)
ALP SERPL-CCNC: 55 UNIT/L (ref 40–150)
ALT SERPL W/O P-5'-P-CCNC: 24 UNIT/L (ref 0–55)
ANION GAP (OHS): 9 MMOL/L (ref 8–16)
AST SERPL-CCNC: 29 UNIT/L (ref 0–50)
BASOPHILS # BLD AUTO: 0.03 K/UL
BASOPHILS NFR BLD AUTO: 0.4 %
BILIRUB SERPL-MCNC: 0.5 MG/DL (ref 0.1–1)
BUN SERPL-MCNC: 10 MG/DL (ref 6–20)
CALCIUM SERPL-MCNC: 9.5 MG/DL (ref 8.7–10.5)
CHLORIDE SERPL-SCNC: 106 MMOL/L (ref 95–110)
CHOLEST SERPL-MCNC: 271 MG/DL (ref 120–199)
CHOLEST/HDLC SERPL: 3.7 {RATIO} (ref 2–5)
CO2 SERPL-SCNC: 25 MMOL/L (ref 23–29)
CREAT SERPL-MCNC: 0.7 MG/DL (ref 0.5–1.4)
EAG (OHS): 100 MG/DL (ref 68–131)
ERYTHROCYTE [DISTWIDTH] IN BLOOD BY AUTOMATED COUNT: 12.8 % (ref 11.5–14.5)
GFR SERPLBLD CREATININE-BSD FMLA CKD-EPI: >60 ML/MIN/1.73/M2
GLUCOSE SERPL-MCNC: 82 MG/DL (ref 70–110)
HBA1C MFR BLD: 5.1 % (ref 4–5.6)
HCT VFR BLD AUTO: 41.5 % (ref 37–48.5)
HDLC SERPL-MCNC: 74 MG/DL (ref 40–75)
HDLC SERPL: 27.3 % (ref 20–50)
HGB BLD-MCNC: 14.1 GM/DL (ref 12–16)
IMM GRANULOCYTES # BLD AUTO: 0.02 K/UL (ref 0–0.04)
IMM GRANULOCYTES NFR BLD AUTO: 0.3 % (ref 0–0.5)
LDLC SERPL CALC-MCNC: 168 MG/DL (ref 63–159)
LYMPHOCYTES # BLD AUTO: 2.2 K/UL (ref 1–4.8)
MCH RBC QN AUTO: 30.4 PG (ref 27–31)
MCHC RBC AUTO-ENTMCNC: 34 G/DL (ref 32–36)
MCV RBC AUTO: 89 FL (ref 82–98)
NONHDLC SERPL-MCNC: 197 MG/DL
NUCLEATED RBC (/100WBC) (OHS): 0 /100 WBC
PLATELET # BLD AUTO: 200 K/UL (ref 150–450)
PMV BLD AUTO: 10.3 FL (ref 9.2–12.9)
POTASSIUM SERPL-SCNC: 4.4 MMOL/L (ref 3.5–5.1)
PROT SERPL-MCNC: 7.8 GM/DL (ref 6–8.4)
RBC # BLD AUTO: 4.64 M/UL (ref 4–5.4)
RELATIVE EOSINOPHIL (OHS): 1.1 %
RELATIVE LYMPHOCYTE (OHS): 31.2 % (ref 18–48)
RELATIVE MONOCYTE (OHS): 4.8 % (ref 4–15)
RELATIVE NEUTROPHIL (OHS): 62.2 % (ref 38–73)
SODIUM SERPL-SCNC: 140 MMOL/L (ref 136–145)
TRIGL SERPL-MCNC: 145 MG/DL (ref 30–150)
TSH SERPL-ACNC: 1.41 UIU/ML (ref 0.4–4)
WBC # BLD AUTO: 7.05 K/UL (ref 3.9–12.7)

## 2025-07-30 PROCEDURE — 99999 PR PBB SHADOW E&M-EST. PATIENT-LVL III: CPT | Mod: PBBFAC,,,

## 2025-07-30 PROCEDURE — 99213 OFFICE O/P EST LOW 20 MIN: CPT | Mod: PBBFAC,PN

## 2025-07-30 PROCEDURE — 85025 COMPLETE CBC W/AUTO DIFF WBC: CPT

## 2025-07-30 PROCEDURE — 80053 COMPREHEN METABOLIC PANEL: CPT

## 2025-07-30 PROCEDURE — 84443 ASSAY THYROID STIM HORMONE: CPT

## 2025-07-30 PROCEDURE — 83036 HEMOGLOBIN GLYCOSYLATED A1C: CPT

## 2025-07-30 PROCEDURE — 36415 COLL VENOUS BLD VENIPUNCTURE: CPT | Mod: PN

## 2025-07-30 PROCEDURE — 80061 LIPID PANEL: CPT

## 2025-07-30 PROCEDURE — 87624 HPV HI-RISK TYP POOLED RSLT: CPT

## 2025-07-30 NOTE — PROGRESS NOTES
HISTORY OF PRESENT ILLNESS:    Rafaela Mas is a 44 y.o. female, , Patient's last menstrual period was 2025 (exact date).,  presents for a routine annual exam . Reports increased heart palpitations recently. This does not occur every day. When it does happen, lasts a few seconds and dissipates spontaneously. Denies any cardiac hx, CP or SOB. She does intermittent fast, first meal of the day around 3-4:00 pm. Stops eating around 9 pm generally. Counseled.   Still interested in possibly conceiving with egg donor. Has been seen at [facility] previously  Not established with PCP    Last pap:     Last mammogram: never, unsure if she wants to do this, counseled. Orders placed should she change her mind.  Last colon ca screenin- negative Cologuard   SA: male partner, does not use condoms  Contraception: none.    Sexually transmitted infection risk: very low risk of STD exposure.   Menstrual flow: regular every 28-30 days.  This is the extent of the patient's complaints at this time.     Past Medical History:   Diagnosis Date    Asthma     as child       Past Surgical History:   Procedure Laterality Date     SECTION         MEDICATIONS AND ALLERGIES:    Current Medications[1]    Review of patient's allergies indicates:   Allergen Reactions    Allergenic extracts        Family History   Problem Relation Name Age of Onset    Asthma Brother      Breast cancer Neg Hx      Ovarian cancer Neg Hx      Colon cancer Neg Hx      Uterine cancer Neg Hx      Cervical cancer Neg Hx         Social History[2]    OB History    Para Term  AB Living   5 3 3  2 3   SAB IAB Ectopic Multiple Live Births   2    3      # Outcome Date GA Lbr Cruz/2nd Weight Sex Type Anes PTL Lv   5 SAB 10/2023 8w0d          4 SAB 10/2022 10w0d          3 Term 2019   4.593 kg (10 lb 2 oz) M Vag-Spont   GAYATRI   2 Term 17 40w0d  3.884 kg (8 lb 9 oz) F Vag-Spont   GAYATRI   1 Term 14 40w0d  2.41 kg (5  "lb 5 oz) F CS-Unspec   GAYATRI          COMPREHENSIVE GYN HISTORY:  PAP History: Denies abnormal Paps.  Infection History: Denies STDs. Denies PID.  Benign History: Denies uterine fibroids. Denies ovarian cysts. Denies endometriosis. Denies other conditions.  Cancer History: Denies cervical cancer. Denies uterine cancer or hyperplasia. Denies ovarian cancer. Denies vulvar cancer or pre-cancer. Denies vaginal cancer or pre-cancer. Denies breast cancer. Denies colon cancer.      ROS:  GENERAL: No weight changes. No swelling. No fatigue. No fever.  BREASTS: No pain. No lumps. No discharge.  ABDOMEN: No pain. No nausea. No vomiting. No diarrhea. No constipation.  REPRODUCTIVE: No abnormal bleeding. No pelvic pain.   VULVA: No pain. No lesions. No itching.  VAGINA: No relaxation. No itching. No odor. No discharge. No lesions.  URINARY: No incontinence. No nocturia. No frequency. No dysuria.    /60   Ht 5' 6" (1.676 m)   Wt 68.7 kg (151 lb 7.3 oz)   LMP 07/12/2025 (Exact Date)   BMI 24.45 kg/m²     PE:  Physical Exam:   Constitutional: She is oriented to person, place, and time. She appears well-developed and well-nourished. No distress.    HENT:   Head: Normocephalic.    Eyes: Pupils are equal, round, and reactive to light.      Pulmonary/Chest: Effort normal. No respiratory distress. She exhibits no mass, no tenderness, no laceration, no edema, no deformity, no swelling and no retraction. Right breast exhibits no inverted nipple, no mass, no nipple discharge, no skin change, no tenderness, no bleeding and no swelling. Left breast exhibits no inverted nipple, no mass, no nipple discharge, no skin change, no tenderness, no bleeding and no swelling.        Abdominal: Soft. She exhibits no distension. There is no abdominal tenderness.     Genitourinary:    Inguinal canal, vagina, uterus, right adnexa and left adnexa normal.      Pelvic exam was performed with patient supine.   The external female genitalia was normal. "     Labial bartholins normal.Cervix is normal. Vagina exhibits no lesion. No erythema, vaginal discharge, tenderness or bleeding in the vagina.    No signs of injury in the vagina.   Cervix exhibits no motion tenderness.    pap smear completed          Musculoskeletal: Normal range of motion and moves all extremeties.       Neurological: She is alert and oriented to person, place, and time.    Skin: Skin is warm and dry.    Psychiatric: She has a normal mood and affect. Judgment and thought content normal.      PROCEDURES/ORDERS:  Pap      Assessment/Plan:    Well woman exam with routine gynecological exam  -     CBC Auto Differential; Future; Expected date: 07/30/2025  -     Comprehensive Metabolic Panel; Future; Expected date: 07/30/2025  -     Hemoglobin A1C; Future; Expected date: 07/30/2025  -     Lipid Panel; Future; Expected date: 07/30/2025  -     TSH; Future; Expected date: 07/30/2025  -     Liquid-Based Pap Smear, Screening    Breast cancer screening by mammogram  -     Mammo Digital Screening Bilat w/ Mio (XPD); Future; Expected date: 07/30/2025    Cervical cancer screening  -     Liquid-Based Pap Smear, Screening    Heart palpitations  -     EKG 12-lead; Future        - Discussed appropriate caloric intake and trial eating breakfast, lunch and dinner for now to see if improvement in symptoms        - Contact info provided for family medicine resident clinic. Encouraged establishing care.       COUNSELING:  The patient was counseled today on:  -A.C.S. Pap and pelvic exam guidelines, recomendations for yearly mammogram, monthly self breast exams and to follow up with her PCP for other health maintenance.    FOLLOW-UP  annually for [facility].          [1]   Current Outpatient Medications:     UNABLE TO FIND, medication name: Fo - Ti, Disp: , Rfl:     aspirin (ECOTRIN) 81 MG EC tablet, Take 81 mg by mouth once daily., Disp: , Rfl:     diphenhydrAMINE (BENADRYL) 25 mg capsule, Take 25 mg by mouth every 6 (six)  hours as needed for Itching., Disp: , Rfl:     doxycycline (VIBRA-TABS) 100 MG tablet, TAKE 1 TABLET BY MOUTH WITH EVENING MEAL THE EVENING BEFORE PROCEDURE THEN TAKE 1 TABLET BY MOUTH EVERY 12 HOURS (Patient not taking: Reported on 4/15/2024), Disp: , Rfl:     omega-3 fatty acids/fish oil (FISH OIL-OMEGA-3 FATTY ACIDS) 300-1,000 mg capsule, Take by mouth once daily. (Patient not taking: Reported on 7/30/2025), Disp: , Rfl:     PNV,calcium 72-iron-folic acid (PRENATAL VITAMIN PLUS LOW IRON) 27 mg iron- 1 mg Tab, , Disp: , Rfl:     UNABLE TO FIND, 750 mg. medication name: Kaur (Patient not taking: Reported on 7/30/2025), Disp: , Rfl:     UNABLE TO FIND, medication name: Nettle (stinging) (Patient not taking: Reported on 7/30/2025), Disp: , Rfl:   [2]   Social History  Socioeconomic History    Marital status:    Tobacco Use    Smoking status: Never    Smokeless tobacco: Never   Substance and Sexual Activity    Alcohol use: Not Currently    Drug use: Never    Sexual activity: Not Currently     Partners: Male

## 2025-08-01 ENCOUNTER — HOSPITAL ENCOUNTER (OUTPATIENT)
Dept: CARDIOLOGY | Facility: HOSPITAL | Age: 45
Discharge: HOME | End: 2025-08-01
Payer: MEDICAID

## 2025-08-01 DIAGNOSIS — R00.2 HEART PALPITATIONS: ICD-10-CM

## 2025-08-01 LAB
OHS QRS DURATION: 82 MS
OHS QTC CALCULATION: 414 MS

## 2025-08-01 PROCEDURE — 93010 ELECTROCARDIOGRAM REPORT: CPT | Mod: ,,, | Performed by: INTERNAL MEDICINE

## 2025-08-01 PROCEDURE — 93005 ELECTROCARDIOGRAM TRACING: CPT | Mod: PO
